# Patient Record
Sex: FEMALE | Race: WHITE
[De-identification: names, ages, dates, MRNs, and addresses within clinical notes are randomized per-mention and may not be internally consistent; named-entity substitution may affect disease eponyms.]

---

## 2017-07-01 ENCOUNTER — HEALTH MAINTENANCE LETTER (OUTPATIENT)
Age: 62
End: 2017-07-01

## 2018-06-12 ENCOUNTER — RADIANT APPOINTMENT (OUTPATIENT)
Dept: GENERAL RADIOLOGY | Facility: CLINIC | Age: 63
End: 2018-06-12
Attending: INTERNAL MEDICINE
Payer: OTHER GOVERNMENT

## 2018-06-12 ENCOUNTER — OFFICE VISIT (OUTPATIENT)
Dept: URGENT CARE | Facility: URGENT CARE | Age: 63
End: 2018-06-12
Payer: OTHER GOVERNMENT

## 2018-06-12 VITALS
TEMPERATURE: 98.6 F | SYSTOLIC BLOOD PRESSURE: 128 MMHG | WEIGHT: 189 LBS | DIASTOLIC BLOOD PRESSURE: 73 MMHG | BODY MASS INDEX: 29.6 KG/M2 | OXYGEN SATURATION: 96 % | RESPIRATION RATE: 17 BRPM | HEART RATE: 78 BPM

## 2018-06-12 DIAGNOSIS — J45.909 ASTHMA, UNSPECIFIED ASTHMA SEVERITY, UNSPECIFIED WHETHER COMPLICATED, UNSPECIFIED WHETHER PERSISTENT: ICD-10-CM

## 2018-06-12 DIAGNOSIS — B07.8 COMMON WART: ICD-10-CM

## 2018-06-12 DIAGNOSIS — R05.9 COUGH: Primary | ICD-10-CM

## 2018-06-12 DIAGNOSIS — R05.9 COUGH: ICD-10-CM

## 2018-06-12 PROCEDURE — 99204 OFFICE O/P NEW MOD 45 MIN: CPT | Performed by: INTERNAL MEDICINE

## 2018-06-12 PROCEDURE — 71046 X-RAY EXAM CHEST 2 VIEWS: CPT | Mod: FY

## 2018-06-12 RX ORDER — MONTELUKAST SODIUM 10 MG/1
TABLET ORAL
COMMUNITY

## 2018-06-12 RX ORDER — CYANOCOBALAMIN 1000 UG/ML
1 INJECTION, SOLUTION INTRAMUSCULAR; SUBCUTANEOUS
COMMUNITY

## 2018-06-12 RX ORDER — POTASSIUM CHLORIDE 750 MG/1
TABLET, EXTENDED RELEASE ORAL
Refills: 2 | COMMUNITY
Start: 2018-05-22

## 2018-06-12 RX ORDER — PANTOPRAZOLE SODIUM 40 MG/1
TABLET, DELAYED RELEASE ORAL
COMMUNITY
Start: 2018-04-06

## 2018-06-12 RX ORDER — FAMOTIDINE 20 MG/1
TABLET, FILM COATED ORAL
COMMUNITY
Start: 2018-04-08

## 2018-06-12 RX ORDER — CITALOPRAM HYDROBROMIDE 20 MG/1
TABLET ORAL
COMMUNITY

## 2018-06-12 RX ORDER — LISINOPRIL 10 MG/1
TABLET ORAL
COMMUNITY

## 2018-06-12 RX ORDER — AZITHROMYCIN 250 MG/1
TABLET, FILM COATED ORAL
COMMUNITY

## 2018-06-12 RX ORDER — MULTIVITAMIN,THERAPEUTIC
1 TABLET ORAL DAILY
COMMUNITY

## 2018-06-12 RX ORDER — LORATADINE 10 MG/1
TABLET ORAL
Refills: 3 | COMMUNITY
Start: 2018-05-27

## 2018-06-12 RX ORDER — FERROUS SULFATE 325(65) MG
TABLET ORAL
Refills: 2 | COMMUNITY
Start: 2018-05-27

## 2018-06-12 RX ORDER — FLUTICASONE PROPIONATE 50 MCG
SPRAY, SUSPENSION (ML) NASAL
COMMUNITY

## 2018-06-12 RX ORDER — GABAPENTIN 600 MG/1
600 TABLET ORAL
COMMUNITY

## 2018-06-12 NOTE — PROGRESS NOTES
SUBJECTIVE:  Erma Pat is an 62 year old female who presents for health concerns.  Is on remicade for chron's since 2017.  Had pneumonia 3 times in 2017.  Had a cough recently and was put on abx zmax, started on June 4th, and completed it 2 days ago.    Completed zmax and cough had improved some but not fully resolved.  Then cough started to be worse again.  And now coughing up some green sputum.  No fevers, chills, or sweats.   At night will take some tessalon perles if needed, which helps some.   No recent intl travel.  Lives in [facility].    Also has been treating a wart on right index finger with liquid medication and with duct tape.  Started to hurt a little and seem like it's more lifted up.         has a past medical history of Asthma, exercise induced; Depression; Osteoarthritis of knee; S/P carpal tunnel release (12/21/2001); S/P gastric bypass (2003); Seasonal allergic rhinitis; and Status post total abdominal hysterectomy (2000). gerd, asthma, hypothyroidism.  Social History     Social History     Marital status:      Spouse name: Torres     Number of children: 2     Years of education: N/A     Occupational History      Unknown     Social History Main Topics     Smoking status: Former Smoker     Types: Cigarettes     Quit date: 3/3/1977     Smokeless tobacco: Not on file      Comment: 35 years ago     Alcohol use Yes      Comment: once a year     Drug use: Not on file     Sexual activity: Not on file     Other Topics Concern     Not on file     Social History Narrative     No narrative on file     Family History   Problem Relation Age of Onset     DIABETES Father      DIABETES Maternal Grandmother      Hypertension Mother        ALLERGIES:  Review of patient's allergies indicates no known allergies.    Current Outpatient Prescriptions   Medication     ASPIRIN NOT PRESCRIBED (INTENTIONAL)     BLOOD GLUCOSE METER KIT     BLOOD GLUCOSE TEST STRIPS STRP     calcium-vitamin D (CALTRATE) 600-400 MG-UNIT  per tablet     citalopram (CELEXA) 20 MG tablet     cyanocobalamin (VITAMIN B12) 1000 MCG/ML injection     famotidine (PEPCID) 20 MG tablet     ferrous sulfate (IRON) 325 (65 Fe) MG tablet     fluticasone (FLONASE) 50 MCG/ACT spray     gabapentin (NEURONTIN) 600 MG tablet     INDAPAMIDE 1.25 MG OR TABS     lisinopril (PRINIVIL/ZESTRIL) 10 MG tablet     loratadine (CLARITIN) 10 MG tablet     montelukast (SINGULAIR) 10 MG tablet     multivitamin, therapeutic (THERA-VIT) TABS tablet     pantoprazole (PROTONIX) 40 MG EC tablet     potassium chloride SA (K-DUR/KLOR-CON M) 10 MEQ CR tablet     azithromycin (ZITHROMAX) 250 MG tablet     levothyroxine (SYNTHROID, LEVOTHROID) 50 MCG tablet     naproxen (NAPROSYN) 500 MG tablet     POTASSIUM CHLORIDE 10 MEQ OR CPCR     PREMARIN 0.625 MG OR TABS     PROAIR  (90 BASE) MCG/ACT IN AERS     quetiapine (SEROQUEL) 50 MG tablet   advair 2 puffs daily.      ROS:  ROS is done and is negative for general/constitutional, eye, ENT, Respiratory, cardiovascular, GI, , Skin, musculoskeletal except as noted elsewhere.  All other review of systems negative except as noted elsewhere.      OBJECTIVE:  /73 (BP Location: Left arm, Patient Position: Chair, Cuff Size: Adult Large)  Pulse 78  Temp 98.6  F (37  C) (Oral)  Resp 17  Wt 189 lb (85.7 kg)  SpO2 96%  BMI 29.6 kg/m2  GENERAL APPEARANCE: Alert, in no acute distress  EYES: normal  EARS: External ears normal. Canals clear. TM's normal.  NOSE:mild clear discharge  OROPHARYNX:normal  NECK:No adenopathy,masses or thyromegaly  RESP: normal and clear to auscultation  CV:regular rate and rhythm and no murmurs, clicks, or gallops  ABDOMEN: Abdomen soft, non-tender. BS normal. No masses, organomegaly  SKIN: right index finger with raised coarse lesion slightly darker than skin color with capillary ends visible, c/w wart.  No surrounding erythema or edema.  MUSCULOSKELETAL:Musculoskeletal normal.  No ankle  edema      RESULTS  .  Recent Results (from the past 48 hour(s))   XR Chest 2 Views    Narrative    CHEST TWO VIEWS   6/12/2018 5:04 PM     HISTORY: Cough.    COMPARISON: None.     FINDINGS: Cardiomediastinal silhouette within normal limits. No focal  airspace opacities. No pleural effusion. No pneumothorax identified.  The bones are unremarkable.       Impression    IMPRESSION: No acute cardiopulmonary abnormalities.       ASSESSMENT/PLAN:    ASSESSMENT / PLAN:  (R05) Cough  (primary encounter diagnosis)  Comment: currently no sign of pneumonia.  Suspect cough is a combination of post-infection cough and asthma.  Also suspect nasal drainage contributing to sxs, so is also advised to use flonase nasal spray, which she has but has not been using  Plan: XR Chest 2 Views        Pt to continue advair daily.  Advised to use her albuterol inhaler prn, which she has not been doing with her cough.  I reviewed supportive care, expected course, and signs of concern.  Follow up as needed or if she does not improve within 7 day(s) or if worsens in any way.  Reviewed red flag symptoms and is to go to the ER if experiences any of these.    (B07.8) Common wart  Comment: index finger.  Discussed tx options and she did not want it frozen today but wants to use otc compound W  Plan: I reviewed supportive care, expected course, and signs of concern.  Follow up as needed or if she does not improve within 3 week(s) or if worsens in any way.  Reviewed red flag symptoms and is to go to the ER if experiences any of these.      (J45.909) Asthma, unspecified asthma severity, unspecified whether complicated, unspecified whether persistent  Comment: contributing to cough as noted above.  No wheezing  Plan: continue routine meds, plus albuterol as noted above.      See [facility] for orders, medications, letters, patient instructions    Marly Genao M.D.

## 2018-06-12 NOTE — MR AVS SNAPSHOT
"              After Visit Summary   6/12/2018    Erma Pat    MRN: [id number]           Patient Information     Date Of Birth          1955        Visit Information        Provider Department      6/12/2018 4:10 PM Marly Genao MD [facility]        Today's Diagnoses     Cough    -  1    Common wart        Asthma, unspecified asthma severity, unspecified whether complicated, unspecified whether persistent           Follow-ups after your visit        Follow-up notes from your care team     Return in about 1 week (around 6/19/2018), or if symptoms worsen or fail to improve.      Who to contact     If you have questions or need follow up information about today's clinic visit or your schedule please contact [facility] directly at [phone].  Normal or non-critical lab and imaging results will be communicated to you by MyChart, letter or phone within 4 business days after the clinic has received the results. If you do not hear from us within 7 days, please contact the clinic through MyChart or phone. If you have a critical or abnormal lab result, we will notify you by phone as soon as possible.  Submit refill requests through [employer] or call your pharmacy and they will forward the refill request to us. Please allow 3 business days for your refill to be completed.          Additional Information About Your Visit        MyChart Information     [employer] lets you send messages to your doctor, view your test results, renew your prescriptions, schedule appointments and more. To sign up, go to [email or web address] . Click on \"Log in\" on the left side of the screen, which will take you to the Welcome page. Then click on \"Sign up Now\" on the right side of the page.     You will be asked to enter the access code listed below, as well as some personal information. Please follow the directions to create your username and password.     Your access code is: " [email or web address]  Expires: 9/10/2018  6:12 PM     Your access code will  in 90 days. If you need help or a new code, please call your [facility] or [phone].        Care EveryWhere ID     This is your Care EveryWhere ID. This could be used by other organizations to access your [place] medical records  [id number]        Your Vitals Were     Pulse Temperature Respirations Pulse Oximetry BMI (Body Mass Index)       78 98.6  F (37  C) (Oral) 17 96% 29.6 kg/m2        Blood Pressure from Last 3 Encounters:   18 128/73   13 132/85   12/04/10 148/89    Weight from Last 3 Encounters:   18 189 lb (85.7 kg)   13 205 lb (93 kg)   12/04/10 212 lb (96.2 kg)               Primary Care Provider Office Phone # Fax #    Jordi Lunsford [phone]       [id number]  [facility] [id number]        Equal Access to Services     [facility]: Hadii aad ku hadasho Soomaali, waaxda luqadaha, qaybta kaalmada adeegyada, waxay idiin hayrahul leavitt . So [facility] [phone].    ATENCIÓN: Si habla español, tiene a wynne disposición servicios gratuitos de asistencia lingüística. Llame al [phone].    We comply with applicable federal civil rights laws and [place] laws. We do not discriminate on the basis of race, color, national origin, age, disability, sex, sexual orientation, or gender identity.            Thank you!     Thank you for choosing [facility]  for your care. Our goal is always to provide you with excellent care. Hearing back from our patients is one way we can continue to improve our services. Please take a few minutes to complete the written survey that you may receive in the mail after your visit with us. Thank you!             Your Updated Medication List - Protect others around you: Learn how to safely use, store and throw away your medicines at www.disposemymeds.org.          This list is accurate as of 18  6:12 PM.  Always use your  most recent med list.                   Brand Name Dispense Instructions for use Diagnosis    ASPIRIN NOT PRESCRIBED    INTENTIONAL     due to rectal bleeding        azithromycin 250 MG tablet    ZITHROMAX     azithromycin 250 mg tablet        * blood glucose monitoring meter device kit    no brand specified    1 Kit    PER PATIENT HEALTH PLAN    Family history of diabetes mellitus (DM)       * BLOOD GLUCOSE TEST STRIPS STRP     50 Strip    PER PATIENT HEALTH PLAN test daily    Family history of diabetes mellitus (DM)       calcium-vitamin D 600-400 MG-UNIT per tablet    CALTRATE     Take 1 tablet by mouth 2 times daily        citalopram 20 MG tablet    celeXA     citalopram 20 mg tablet        cyanocobalamin 1000 MCG/ML injection    VITAMIN B12     Inject 1 mL into the muscle every 30 days        famotidine 20 MG tablet    PEPCID          ferrous sulfate 325 (65 Fe) MG tablet    IRON     TK 1 T PO TID        fluticasone 50 MCG/ACT spray    FLONASE     fluticasone 50 mcg/actuation nasal spray,suspension        gabapentin 600 MG tablet    NEURONTIN     600 mg        indapamide 1.25 MG tablet    LOZOL    90 Tab    1 TABLETS EVERY MORNING    HTN (hypertension)       levothyroxine 50 MCG tablet    SYNTHROID/LEVOTHROID    90 tablet    Take 1 tablet by mouth daily.    Hypothyroidism       lisinopril 10 MG tablet    PRINIVIL/ZESTRIL     lisinopril 10 mg tablet        loratadine 10 MG tablet    CLARITIN     TK 1 T PO QD        montelukast 10 MG tablet    SINGULAIR     montelukast 10 mg tablet        multivitamin, therapeutic Tabs tablet      Take 1 tablet by mouth daily        naproxen 500 MG tablet    NAPROSYN    60 tablet    TAKE 1 TABLET TWICE A DAY AS NEEDED FOR MODERATE PAIN (PLEASE FOLLOW UP IN CLINIC FOR NEXT REFILL)    Osteoarthrosis, unspecified whether generalized or localized, lower leg       pantoprazole 40 MG EC tablet    PROTONIX          potassium chloride 10 MEQ CR capsule    MICRO-K    90 Cap    ONE TABLET  DAILY    HTN (hypertension)       potassium chloride SA 10 MEQ CR tablet    K-DUR/KLOR-CON M          PREMARIN 0.625 MG tablet   Generic drug:  estrogens (conjugated)     90 Tab    ONE DAILY    Menopause       PROAIR  (90 Base) MCG/ACT Inhaler   Generic drug:  albuterol      2 puffs every 6 hrs prn        QUEtiapine 50 MG tablet    SEROQUEL    60 tablet    Take 2 tablets by mouth At Bedtime. Needs appointment for next refill.    Insomnia       * Notice:  This list has 2 medication(s) that are the same as other medications prescribed for you. Read the directions carefully, and ask your doctor or other care provider to review them with you.

## 2020-04-23 ENCOUNTER — HOSPITAL ENCOUNTER (OUTPATIENT)
Dept: HOSPITAL 90 - RAH | Age: 65
Discharge: HOME | End: 2020-04-23
Attending: FAMILY MEDICINE
Payer: OTHER GOVERNMENT

## 2020-04-23 DIAGNOSIS — N64.89: ICD-10-CM

## 2020-04-23 DIAGNOSIS — Z12.31: Primary | ICD-10-CM

## 2020-04-23 PROCEDURE — 77067 SCR MAMMO BI INCL CAD: CPT

## 2024-12-10 ENCOUNTER — HOSPITAL ENCOUNTER (OUTPATIENT)
Dept: HOSPITAL 90 - RAH | Age: 69
Discharge: HOME | End: 2024-12-10
Attending: INTERNAL MEDICINE
Payer: MEDICARE

## 2024-12-10 DIAGNOSIS — I31.39: ICD-10-CM

## 2024-12-10 DIAGNOSIS — I27.20: ICD-10-CM

## 2024-12-10 DIAGNOSIS — I34.0: Primary | ICD-10-CM

## 2024-12-10 DIAGNOSIS — I51.81: ICD-10-CM

## 2024-12-10 PROCEDURE — 93306 TTE W/DOPPLER COMPLETE: CPT

## 2024-12-10 NOTE — HMCSR
--------------- APPROVED REPORT --------------





EXAM: Two-dimensional and M-mode echocardiogram with Doppler and color Doppler.



INDICATION

ICD:  Takotsubo cardiomyopathy I51.81  



2D Dimensions

RVDd2.9 cmLVEF(%)60.4 (>50%)LVED Vol(simp.)44.7 mL

IVSd1.4 (0.7-1.1cm)FS(%)30 %LVES Vol(simp.)18.3 mL

LVDd2.3 (3.8-5.6cm)LA (2D)1.9 (1.6-4.0cm)LVEF(%, simp.)59 %

PWd1.2 (0.7-1.1cm)Ao Root(2D)3.4 (2.0-3.7cm)LA ESV INDEX (4CH)14.30 mL/m2

IVSs1.6 cmLVOT diam1.9 (1.8-2.4cm)LA ESV INDEX (2CH)14.90 mL/m2

LVDs1.6 (2.5-4.0cm)

PWs1.4 cm



M-Mode Dimensions

EPSS0.6 cm

LA (MM)1.7 (1.6-4.0cm)

Ao Root(MM)3.4 (2.0-3.7cm)



Aortic Valve

AoV VTI0.8 mAo Mean GR43.0 mmHgLVOT VTI0.40 m

ADELE (VMAX)1.5 cm2AVA (VTI) 1.5 cm2



Mitral Valve

MV E Vmax65.0 cm/sDECEL Ngmz860 ms

MV A Vmax83.9 cm/sP 1/2 T30 ms

E/A ratio0.8MVA (PHT)7.3 cm2

MR Max  mmHg



TDI

E/E' Xbisor90.1E/E' Aowtfms27.7

Medial E' Peak V3.40 cm/sLateral E' Peak V3.30 cm/s



Pulmonary Valve

PV Vmax0.9 m/s

PV Peak GR3.1 mmHg



Tricuspid Valve

TR Vmax2.4 m/sRAP (EST) 3 kfSqVIAJ98.6 mmHg

TR Peak GR22.6 mmHg



Left Ventricle

Left ventricular cavity size is normal. Normal wall motion Moderate concentric left ventricular hyper
trophy. LVEF is >55%. Stage I diastolic dysfunction.



Right Ventricle

The right ventricle is normal size. The right ventricle is hyperdynamic.



Atria

The left atrium size is normal. The right atrium size is normal.



Aortic Valve

Aortic valve leaflets open well. No aortic regurgitation is present. There is no aortic valvular sten
osis.  LVOT obstruction with pk gradient of 109mmHg and mean gradient of 43mmHg. 



Mitral Valve

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 Systolic anterior motion o
f the mitral valve leaflet present. There is mild mitral valve regurgitation noted. There is no edgar
l valve stenosis.



Tricuspid Valve

The tricuspid valve is normal in structure and function. There is trace of tricuspid valve regurgitat
ion noted.



Pulmonic Valve

The pulmonary valve is normal in structure and function. There is no pulmonic valvular regurgitation.
 



Great Vessels

The aortic root is normal in size. The IVC is normal in size and collapses >50% with inspiration.



Pericardium

Small pericardial effusion.



 Other Information 

Quality : Adequate



Conclusion

LVEF is >55%.

Moderate concentric left ventricular hypertrophy.

Left ventricular cavity size is normal.

Stage I diastolic dysfunction.

Normal wall motion

There is no aortic valvular stenosis. 

LVOT obstruction with pk gradient of 109mmHg and mean gradient of 43mmHg.

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 Systolic anterior motion o
f the mitral valve leaflet present.

There is mild mitral valve regurgitation noted.

Small pericardial effusion.

Mild pulmonary hypertension

Study quality was adequate

## 2025-01-17 ENCOUNTER — HOSPITAL ENCOUNTER (OUTPATIENT)
Dept: HOSPITAL 90 - RAH | Age: 70
Discharge: HOME | End: 2025-01-17
Attending: INTERNAL MEDICINE
Payer: MEDICARE

## 2025-01-17 DIAGNOSIS — K44.9: ICD-10-CM

## 2025-01-17 DIAGNOSIS — K22.2: Primary | ICD-10-CM

## 2025-01-17 DIAGNOSIS — R93.3: ICD-10-CM

## 2025-01-17 PROCEDURE — 74220 X-RAY XM ESOPHAGUS 1CNTRST: CPT

## 2025-01-17 NOTE — HMCIMG
ESOPHAGUS



REASON: Esophageal obstruction; Abnormal findings on diagnostic imaging

of other pa



COMPARISON: None 



TECHNIQUE: Esophagram was performed with fluoroscopic observation,

fluoroscopy time 1 minute.  Exam was performed with difficulty as

patient was weak and had to difficult time standing.  Initial barium

administration was with the table and approximately 60 degree angle. 



FINDINGS: 



Swallowing mechanism appears unremarkable.  Proximal and mid esophagus

appear normal.  There is a long segment of irregular narrowing in the

distal esophagus.  This measures approximately 5 cm.  The narrowing

appears fixed and does not distend with peristalsis.  The narrowest

point in the distal esophagus is between 3 and 4 mm. 



 Patient was turned to the supine position for additional images. 

Patient was then raised to 260 degrees.  There was retained barium in

the esophagus, this again showed persistent marked narrowing of the

distal esophagus.



There is a small sliding hiatal hernia.  Exam is otherwise unremarkable.



IMPRESSION: 

  

1.  Severe irregular narrowing of the last 5 cm of the esophagus, the

narrowest point is approximately 3 to 4 mm.

2.  Otherwise unremarkable exam.

## 2025-01-28 ENCOUNTER — HOSPITAL ENCOUNTER (OUTPATIENT)
Dept: HOSPITAL 90 - ENDO | Age: 70
Discharge: HOME | End: 2025-01-28
Attending: INTERNAL MEDICINE
Payer: MEDICARE

## 2025-01-28 VITALS
TEMPERATURE: 97.4 F | SYSTOLIC BLOOD PRESSURE: 109 MMHG | HEART RATE: 90 BPM | RESPIRATION RATE: 16 BRPM | DIASTOLIC BLOOD PRESSURE: 77 MMHG

## 2025-01-28 VITALS
TEMPERATURE: 97 F | HEART RATE: 78 BPM | SYSTOLIC BLOOD PRESSURE: 112 MMHG | DIASTOLIC BLOOD PRESSURE: 78 MMHG | RESPIRATION RATE: 14 BRPM

## 2025-01-28 VITALS
SYSTOLIC BLOOD PRESSURE: 119 MMHG | HEART RATE: 84 BPM | RESPIRATION RATE: 16 BRPM | DIASTOLIC BLOOD PRESSURE: 76 MMHG | TEMPERATURE: 97 F

## 2025-01-28 VITALS
SYSTOLIC BLOOD PRESSURE: 120 MMHG | TEMPERATURE: 97 F | DIASTOLIC BLOOD PRESSURE: 76 MMHG | RESPIRATION RATE: 10 BRPM | HEART RATE: 61 BPM

## 2025-01-28 VITALS
TEMPERATURE: 97 F | DIASTOLIC BLOOD PRESSURE: 77 MMHG | SYSTOLIC BLOOD PRESSURE: 116 MMHG | HEART RATE: 78 BPM | RESPIRATION RATE: 16 BRPM

## 2025-01-28 VITALS — SYSTOLIC BLOOD PRESSURE: 120 MMHG | RESPIRATION RATE: 15 BRPM | DIASTOLIC BLOOD PRESSURE: 71 MMHG | HEART RATE: 77 BPM

## 2025-01-28 VITALS
DIASTOLIC BLOOD PRESSURE: 72 MMHG | TEMPERATURE: 97 F | SYSTOLIC BLOOD PRESSURE: 116 MMHG | HEART RATE: 80 BPM | RESPIRATION RATE: 16 BRPM

## 2025-01-28 VITALS
DIASTOLIC BLOOD PRESSURE: 74 MMHG | RESPIRATION RATE: 16 BRPM | TEMPERATURE: 97 F | HEART RATE: 81 BPM | SYSTOLIC BLOOD PRESSURE: 120 MMHG

## 2025-01-28 VITALS
TEMPERATURE: 97.2 F | HEART RATE: 72 BPM | DIASTOLIC BLOOD PRESSURE: 70 MMHG | RESPIRATION RATE: 14 BRPM | SYSTOLIC BLOOD PRESSURE: 118 MMHG

## 2025-01-28 VITALS
HEART RATE: 86 BPM | DIASTOLIC BLOOD PRESSURE: 71 MMHG | SYSTOLIC BLOOD PRESSURE: 120 MMHG | RESPIRATION RATE: 16 BRPM | TEMPERATURE: 97 F

## 2025-01-28 VITALS — HEIGHT: 61 IN | BODY MASS INDEX: 19.45 KG/M2 | WEIGHT: 103 LBS

## 2025-01-28 DIAGNOSIS — Z98.84: ICD-10-CM

## 2025-01-28 DIAGNOSIS — Z79.899: ICD-10-CM

## 2025-01-28 DIAGNOSIS — R13.10: Primary | ICD-10-CM

## 2025-01-28 DIAGNOSIS — K59.09: ICD-10-CM

## 2025-01-28 DIAGNOSIS — E78.5: ICD-10-CM

## 2025-01-28 DIAGNOSIS — Z87.898: ICD-10-CM

## 2025-01-28 DIAGNOSIS — K21.00: ICD-10-CM

## 2025-01-28 DIAGNOSIS — I51.81: ICD-10-CM

## 2025-01-28 DIAGNOSIS — F32.A: ICD-10-CM

## 2025-01-28 DIAGNOSIS — K50.90: ICD-10-CM

## 2025-01-28 DIAGNOSIS — I10: ICD-10-CM

## 2025-01-28 DIAGNOSIS — K22.2: ICD-10-CM

## 2025-01-28 DIAGNOSIS — K57.30: ICD-10-CM

## 2025-01-28 DIAGNOSIS — Z98.890: ICD-10-CM

## 2025-01-28 DIAGNOSIS — R93.3: ICD-10-CM

## 2025-01-28 DIAGNOSIS — E03.9: ICD-10-CM

## 2025-01-28 PROCEDURE — A4221 SUPP NON-INSULIN INF CATH/WK: HCPCS

## 2025-01-28 PROCEDURE — A4223 INFUSION SUPPLIES W/O PUMP: HCPCS

## 2025-01-28 PROCEDURE — A4615 CANNULA NASAL: HCPCS

## 2025-01-28 PROCEDURE — A4663 DIALYSIS BLOOD PRESSURE CUFF: HCPCS

## 2025-01-28 PROCEDURE — A4215 STERILE NEEDLE: HCPCS

## 2025-01-28 PROCEDURE — A4222 INFUSION SUPPLIES WITH PUMP: HCPCS

## 2025-01-28 PROCEDURE — 43239 EGD BIOPSY SINGLE/MULTIPLE: CPT

## 2025-01-28 PROCEDURE — C1726 CATH, BAL DIL, NON-VASCULAR: HCPCS

## 2025-01-28 PROCEDURE — A4606 OXYGEN PROBE USED W OXIMETER: HCPCS

## 2025-01-28 PROCEDURE — 43249 ESOPH EGD DILATION <30 MM: CPT

## 2025-01-28 RX ADMIN — SODIUM CHLORIDE ONE MLS/HR: 0.9 INJECTION, SOLUTION INTRAVENOUS at 06:26

## 2025-01-28 NOTE — NUR
Full and complete discharge instructions given to Patient and family. Voiced understanding 
of GI procedures and follow up expectations/Diet. All questions answered. PIV removed with 
catheter tip intact.

W/C to POV with Family.

## 2025-04-04 ENCOUNTER — HOSPITAL ENCOUNTER (INPATIENT)
Dept: HOSPITAL 90 - EDH | Age: 70
LOS: 10 days | Discharge: NURSING, REHAB OR LONG-TERM CARE | DRG: 393 | End: 2025-04-14
Attending: INTERNAL MEDICINE | Admitting: INTERNAL MEDICINE
Payer: MEDICARE

## 2025-04-04 VITALS — HEART RATE: 85 BPM | RESPIRATION RATE: 19 BRPM | OXYGEN SATURATION: 100 %

## 2025-04-04 VITALS — HEIGHT: 67 IN | WEIGHT: 139 LBS | BODY MASS INDEX: 21.82 KG/M2

## 2025-04-04 DIAGNOSIS — I11.0: ICD-10-CM

## 2025-04-04 DIAGNOSIS — Z87.11: ICD-10-CM

## 2025-04-04 DIAGNOSIS — E78.5: ICD-10-CM

## 2025-04-04 DIAGNOSIS — Z85.01: ICD-10-CM

## 2025-04-04 DIAGNOSIS — F41.9: ICD-10-CM

## 2025-04-04 DIAGNOSIS — Z16.11: ICD-10-CM

## 2025-04-04 DIAGNOSIS — K57.30: ICD-10-CM

## 2025-04-04 DIAGNOSIS — E03.9: ICD-10-CM

## 2025-04-04 DIAGNOSIS — N30.00: ICD-10-CM

## 2025-04-04 DIAGNOSIS — E43: ICD-10-CM

## 2025-04-04 DIAGNOSIS — Z74.01: ICD-10-CM

## 2025-04-04 DIAGNOSIS — Z20.822: ICD-10-CM

## 2025-04-04 DIAGNOSIS — Z79.899: ICD-10-CM

## 2025-04-04 DIAGNOSIS — I50.33: ICD-10-CM

## 2025-04-04 DIAGNOSIS — J44.9: ICD-10-CM

## 2025-04-04 DIAGNOSIS — T18.108A: ICD-10-CM

## 2025-04-04 DIAGNOSIS — K22.2: ICD-10-CM

## 2025-04-04 DIAGNOSIS — R18.8: ICD-10-CM

## 2025-04-04 DIAGNOSIS — R62.7: ICD-10-CM

## 2025-04-04 DIAGNOSIS — K21.9: ICD-10-CM

## 2025-04-04 DIAGNOSIS — J69.0: ICD-10-CM

## 2025-04-04 DIAGNOSIS — I31.39: ICD-10-CM

## 2025-04-04 DIAGNOSIS — I42.9: ICD-10-CM

## 2025-04-04 DIAGNOSIS — T18.3XXA: Primary | ICD-10-CM

## 2025-04-04 DIAGNOSIS — Z93.1: ICD-10-CM

## 2025-04-04 DIAGNOSIS — E11.9: ICD-10-CM

## 2025-04-04 DIAGNOSIS — Z98.0: ICD-10-CM

## 2025-04-04 DIAGNOSIS — D53.9: ICD-10-CM

## 2025-04-04 DIAGNOSIS — D72.819: ICD-10-CM

## 2025-04-04 DIAGNOSIS — D51.9: ICD-10-CM

## 2025-04-04 DIAGNOSIS — Z98.84: ICD-10-CM

## 2025-04-04 DIAGNOSIS — K59.00: ICD-10-CM

## 2025-04-04 DIAGNOSIS — F32.A: ICD-10-CM

## 2025-04-04 DIAGNOSIS — F43.20: ICD-10-CM

## 2025-04-04 DIAGNOSIS — K50.90: ICD-10-CM

## 2025-04-04 DIAGNOSIS — K22.3: ICD-10-CM

## 2025-04-04 LAB
BASOPHILS # BLD AUTO: 0.02 K/UL (ref 0–0.2)
BASOPHILS NFR BLD AUTO: 0.5 % (ref 0–5)
CREAT SERPL-MCNC: 0.4 MG/DL (ref 0.5–1)
ERYTHROCYTE [DISTWIDTH] IN BLOOD BY AUTOMATED COUNT: 20.9 % (ref 11–15.5)
HCT VFR BLD AUTO: 32.3 % (ref 36–48)
IMM GRANULOCYTES # BLD: 0.01 K/UL (ref 0–1)
LYMPHOCYTES # SPEC AUTO: 0.6 K/UL (ref 1–4.8)
LYMPHOCYTES NFR SPEC AUTO: 13.3 % (ref 21–51)
MCH RBC QN AUTO: 33.3 PG (ref 27–33)
MCV RBC AUTO: 107.7 FL (ref 79–99)
MONOCYTES # BLD AUTO: 0.2 K/UL (ref 0.1–1)
NEUTROPHILS # BLD AUTO: 3.6 K/UL (ref 1.8–7.7)
PLATELET # BLD AUTO: 260 K/UL (ref 130–400)
POTASSIUM SERPL-SCNC: 3.8 MMOL/L (ref 3.5–5.1)
RBC MORPH BLD: (no result)
SARS-COV-2 AG RESP QL IA.RAPID: (no result)
WBC # BLD AUTO: 4.4 K/UL (ref 4.8–10.8)

## 2025-04-04 PROCEDURE — 87426 SARSCOV CORONAVIRUS AG IA: CPT

## 2025-04-04 PROCEDURE — 81001 URINALYSIS AUTO W/SCOPE: CPT

## 2025-04-04 PROCEDURE — 74240 X-RAY XM UPR GI TRC 1CNTRST: CPT

## 2025-04-04 PROCEDURE — A4649 SURGICAL SUPPLIES: HCPCS

## 2025-04-04 PROCEDURE — 43266 EGD ENDOSCOPIC STENT PLACE: CPT

## 2025-04-04 PROCEDURE — A4222 INFUSION SUPPLIES WITH PUMP: HCPCS

## 2025-04-04 PROCEDURE — 83690 ASSAY OF LIPASE: CPT

## 2025-04-04 PROCEDURE — 74360 X-RAY GUIDE GI DILATION: CPT

## 2025-04-04 PROCEDURE — 36569 INSJ PICC 5 YR+ W/O IMAGING: CPT

## 2025-04-04 PROCEDURE — 85025 COMPLETE CBC W/AUTO DIFF WBC: CPT

## 2025-04-04 PROCEDURE — 93356 MYOCRD STRAIN IMG SPCKL TRCK: CPT

## 2025-04-04 PROCEDURE — 74018 RADEX ABDOMEN 1 VIEW: CPT

## 2025-04-04 PROCEDURE — 93005 ELECTROCARDIOGRAM TRACING: CPT

## 2025-04-04 PROCEDURE — 84484 ASSAY OF TROPONIN QUANT: CPT

## 2025-04-04 PROCEDURE — A4346 CATH INDW FOLEY 3 WAY: HCPCS

## 2025-04-04 PROCEDURE — 84100 ASSAY OF PHOSPHORUS: CPT

## 2025-04-04 PROCEDURE — 80048 BASIC METABOLIC PNL TOTAL CA: CPT

## 2025-04-04 PROCEDURE — 43235 EGD DIAGNOSTIC BRUSH WASH: CPT

## 2025-04-04 PROCEDURE — 84439 ASSAY OF FREE THYROXINE: CPT

## 2025-04-04 PROCEDURE — A4215 STERILE NEEDLE: HCPCS

## 2025-04-04 PROCEDURE — C1874 STENT, COATED/COV W/DEL SYS: HCPCS

## 2025-04-04 PROCEDURE — 94640 AIRWAY INHALATION TREATMENT: CPT

## 2025-04-04 PROCEDURE — 99285 EMERGENCY DEPT VISIT HI MDM: CPT

## 2025-04-04 PROCEDURE — 94664 DEMO&/EVAL PT USE INHALER: CPT

## 2025-04-04 PROCEDURE — 43200 ESOPHAGOSCOPY FLEXIBLE BRUSH: CPT

## 2025-04-04 PROCEDURE — 84425 ASSAY OF VITAMIN B-1: CPT

## 2025-04-04 PROCEDURE — 80053 COMPREHEN METABOLIC PANEL: CPT

## 2025-04-04 PROCEDURE — A4223 INFUSION SUPPLIES W/O PUMP: HCPCS

## 2025-04-04 PROCEDURE — 43247 EGD REMOVE FOREIGN BODY: CPT

## 2025-04-04 PROCEDURE — A4344 CATH INDW FOLEY 2 WAY SILICN: HCPCS

## 2025-04-04 PROCEDURE — 92610 EVALUATE SWALLOWING FUNCTION: CPT

## 2025-04-04 PROCEDURE — A4620 VARIABLE CONCENTRATION MASK: HCPCS

## 2025-04-04 PROCEDURE — 87086 URINE CULTURE/COLONY COUNT: CPT

## 2025-04-04 PROCEDURE — 87804 INFLUENZA ASSAY W/OPTIC: CPT

## 2025-04-04 PROCEDURE — 82948 REAGENT STRIP/BLOOD GLUCOSE: CPT

## 2025-04-04 PROCEDURE — 84132 ASSAY OF SERUM POTASSIUM: CPT

## 2025-04-04 PROCEDURE — 85730 THROMBOPLASTIN TIME PARTIAL: CPT

## 2025-04-04 PROCEDURE — A7002 TUBING USED W SUCTION PUMP: HCPCS

## 2025-04-04 PROCEDURE — 83880 ASSAY OF NATRIURETIC PEPTIDE: CPT

## 2025-04-04 PROCEDURE — 87040 BLOOD CULTURE FOR BACTERIA: CPT

## 2025-04-04 PROCEDURE — 84481 FREE ASSAY (FT-3): CPT

## 2025-04-04 PROCEDURE — 36415 COLL VENOUS BLD VENIPUNCTURE: CPT

## 2025-04-04 PROCEDURE — 84145 PROCALCITONIN (PCT): CPT

## 2025-04-04 PROCEDURE — 93306 TTE W/DOPPLER COMPLETE: CPT

## 2025-04-04 PROCEDURE — 87186 SC STD MICRODIL/AGAR DIL: CPT

## 2025-04-04 PROCEDURE — A4606 OXYGEN PROBE USED W OXIMETER: HCPCS

## 2025-04-04 PROCEDURE — 71045 X-RAY EXAM CHEST 1 VIEW: CPT

## 2025-04-04 PROCEDURE — 80076 HEPATIC FUNCTION PANEL: CPT

## 2025-04-04 PROCEDURE — 83735 ASSAY OF MAGNESIUM: CPT

## 2025-04-04 PROCEDURE — 82306 VITAMIN D 25 HYDROXY: CPT

## 2025-04-04 PROCEDURE — 85027 COMPLETE CBC AUTOMATED: CPT

## 2025-04-04 PROCEDURE — 71250 CT THORAX DX C-: CPT

## 2025-04-04 PROCEDURE — 85610 PROTHROMBIN TIME: CPT

## 2025-04-04 PROCEDURE — 82607 VITAMIN B-12: CPT

## 2025-04-04 PROCEDURE — 74150 CT ABDOMEN W/O CONTRAST: CPT

## 2025-04-04 PROCEDURE — 84443 ASSAY THYROID STIM HORMONE: CPT

## 2025-04-04 PROCEDURE — A4657 SYRINGE W/WO NEEDLE: HCPCS

## 2025-04-04 RX ADMIN — SODIUM CHLORIDE SCH MLS/HR: 0.9 INJECTION, SOLUTION INTRAVENOUS at 19:34

## 2025-04-04 RX ADMIN — SODIUM CHLORIDE SOLN NEBU 3% ONE ML: 3 NEBU SOLN at 23:26

## 2025-04-04 RX ADMIN — Medication SCH MLS/HR: at 19:30

## 2025-04-04 RX ADMIN — PIPERACILLIN SODIUM AND TAZOBACTAM SODIUM SCH GM: .375; 3 INJECTION, POWDER, LYOPHILIZED, FOR SOLUTION INTRAVENOUS at 21:48

## 2025-04-05 VITALS — RESPIRATION RATE: 18 BRPM | OXYGEN SATURATION: 99 %

## 2025-04-05 VITALS
SYSTOLIC BLOOD PRESSURE: 113 MMHG | RESPIRATION RATE: 16 BRPM | HEART RATE: 94 BPM | TEMPERATURE: 98 F | DIASTOLIC BLOOD PRESSURE: 64 MMHG

## 2025-04-05 VITALS
SYSTOLIC BLOOD PRESSURE: 131 MMHG | RESPIRATION RATE: 16 BRPM | TEMPERATURE: 97.9 F | DIASTOLIC BLOOD PRESSURE: 73 MMHG | HEART RATE: 87 BPM

## 2025-04-05 VITALS — HEART RATE: 86 BPM | RESPIRATION RATE: 18 BRPM

## 2025-04-05 VITALS — RESPIRATION RATE: 18 BRPM | HEART RATE: 88 BPM

## 2025-04-05 VITALS — RESPIRATION RATE: 18 BRPM | HEART RATE: 84 BPM | OXYGEN SATURATION: 94 %

## 2025-04-05 VITALS
RESPIRATION RATE: 16 BRPM | SYSTOLIC BLOOD PRESSURE: 111 MMHG | TEMPERATURE: 98.4 F | DIASTOLIC BLOOD PRESSURE: 66 MMHG | HEART RATE: 91 BPM

## 2025-04-05 VITALS — RESPIRATION RATE: 18 BRPM | HEART RATE: 96 BPM

## 2025-04-05 VITALS — HEART RATE: 84 BPM | RESPIRATION RATE: 18 BRPM

## 2025-04-05 VITALS — OXYGEN SATURATION: 95 %

## 2025-04-05 LAB
ALBUMIN SERPL-MCNC: 1.4 G/DL (ref 3.5–5)
APPEARANCE UR: CLEAR
BACTERIA URNS QL MICRO: (no result) /HPF
BASOPHILS # BLD AUTO: 0.02 K/UL (ref 0–0.2)
BASOPHILS NFR BLD AUTO: 0.5 % (ref 0–5)
BILIRUB DIRECT SERPL-MCNC: 0.1 MG/DL (ref 0–0.3)
BILIRUB SERPL-MCNC: 0.2 MG/DL (ref 0.2–1)
BILIRUB UR QL STRIP: NEGATIVE MG/DL
CELLS COUNTED: 100
COLOR UR: YELLOW
CREAT SERPL-MCNC: 0.4 MG/DL (ref 0.5–1)
DEPRECATED SQUAMOUS URNS QL MICRO: (no result) /HPF (ref 0–2)
ERYTHROCYTE [DISTWIDTH] IN BLOOD BY AUTOMATED COUNT: 20.7 % (ref 11–15.5)
GLUCOSE UR STRIP-MCNC: NEGATIVE MG/DL
HCT VFR BLD AUTO: 32.5 % (ref 36–48)
IMM GRANULOCYTES # BLD: 0.01 K/UL (ref 0–1)
KETONES UR STRIP-MCNC: 10 MG/DL
LEUKOCYTE ESTERASE UR QL STRIP: 75 LEU/UL
LYMPHOCYTES # SPEC AUTO: 0.5 K/UL (ref 1–4.8)
LYMPHOCYTES NFR BLD MANUAL: 8 % (ref 22–44)
LYMPHOCYTES NFR SPEC AUTO: 11.6 % (ref 21–51)
MANUAL DIF COMMENT BLD-IMP: (no result)
MCH RBC QN AUTO: 33.4 PG (ref 27–33)
MCHC RBC AUTO-ENTMCNC: 31.1 G/DL (ref 32–36)
MCV RBC AUTO: 107.6 FL (ref 79–99)
MICRO URNS: YES
MONOCYTES # BLD AUTO: 0.2 K/UL (ref 0.1–1)
MONOCYTES NFR BLD AUTO: 4.1 % (ref 3–13)
MUCOUS THREADS URNS QL MICRO: (no result) LPF
NEUTROPHILS # BLD AUTO: 3.5 K/UL (ref 1.8–7.7)
NEUTROPHILS NFR BLD AUTO: 83.6 % (ref 40–77)
NEUTS SEG NFR BLD MANUAL: 91 % (ref 40–70)
NITRITE UR QL STRIP: (no result)
PHOSPHATE SERPL-MCNC: 3.8 MG/DL (ref 2.5–4.9)
PLAT MORPH BLD: ADEQUATE
PLATELET # BLD AUTO: 226 K/UL (ref 130–400)
POTASSIUM SERPL-SCNC: 4.3 MMOL/L (ref 3.5–5.1)
PROT UR QL STRIP: 10 MG/DL
RBC # BLD AUTO: 3.02 MIL/UL (ref 4–5.5)
RBC MORPH BLD: (no result)
SP GR UR STRIP: 1.02 (ref 1–1.03)
TSH SERPL DL<=0.05 MIU/L-ACNC: 3.95 UIU/ML (ref 0.36–3.74)
UROBILINOGEN UR STRIP-MCNC: 0.2 MG/DL (ref 0.2–1)
VARIANT LYMPHS NFR BLD MANUAL: 1 % (ref 0–0)
WBC # BLD AUTO: 4.1 K/UL (ref 4.8–10.8)
WBC #/AREA URNS HPF: (no result) /HPF (ref 0–1)
WBC MORPH BLD: NORMAL

## 2025-04-05 RX ADMIN — ENOXAPARIN SODIUM SCH MG: 40 INJECTION SUBCUTANEOUS at 10:15

## 2025-04-05 RX ADMIN — SODIUM CHLORIDE SOLN NEBU 3% ONE ML: 3 NEBU SOLN at 06:59

## 2025-04-05 RX ADMIN — SODIUM CHLORIDE SOLN NEBU 3% ONE ML: 3 NEBU SOLN at 11:36

## 2025-04-05 RX ADMIN — FAMOTIDINE SCH MG: 10 INJECTION INTRAVENOUS at 10:12

## 2025-04-06 VITALS
TEMPERATURE: 98.6 F | RESPIRATION RATE: 16 BRPM | SYSTOLIC BLOOD PRESSURE: 105 MMHG | HEART RATE: 93 BPM | DIASTOLIC BLOOD PRESSURE: 67 MMHG

## 2025-04-06 VITALS — SYSTOLIC BLOOD PRESSURE: 112 MMHG | RESPIRATION RATE: 15 BRPM | DIASTOLIC BLOOD PRESSURE: 69 MMHG | HEART RATE: 90 BPM

## 2025-04-06 VITALS — DIASTOLIC BLOOD PRESSURE: 82 MMHG | SYSTOLIC BLOOD PRESSURE: 113 MMHG | RESPIRATION RATE: 16 BRPM | HEART RATE: 87 BPM

## 2025-04-06 VITALS — OXYGEN SATURATION: 94 % | HEART RATE: 98 BPM | RESPIRATION RATE: 18 BRPM

## 2025-04-06 VITALS
RESPIRATION RATE: 16 BRPM | DIASTOLIC BLOOD PRESSURE: 67 MMHG | HEART RATE: 85 BPM | TEMPERATURE: 98.1 F | SYSTOLIC BLOOD PRESSURE: 114 MMHG

## 2025-04-06 VITALS
DIASTOLIC BLOOD PRESSURE: 63 MMHG | HEART RATE: 86 BPM | TEMPERATURE: 98.3 F | RESPIRATION RATE: 16 BRPM | SYSTOLIC BLOOD PRESSURE: 100 MMHG

## 2025-04-06 VITALS — HEART RATE: 89 BPM | SYSTOLIC BLOOD PRESSURE: 109 MMHG | DIASTOLIC BLOOD PRESSURE: 79 MMHG | RESPIRATION RATE: 16 BRPM

## 2025-04-06 VITALS
DIASTOLIC BLOOD PRESSURE: 68 MMHG | RESPIRATION RATE: 16 BRPM | SYSTOLIC BLOOD PRESSURE: 92 MMHG | HEART RATE: 92 BPM | TEMPERATURE: 99 F

## 2025-04-06 VITALS
SYSTOLIC BLOOD PRESSURE: 92 MMHG | RESPIRATION RATE: 15 BRPM | TEMPERATURE: 97.9 F | DIASTOLIC BLOOD PRESSURE: 51 MMHG | HEART RATE: 83 BPM

## 2025-04-06 VITALS — RESPIRATION RATE: 18 BRPM | HEART RATE: 85 BPM

## 2025-04-06 VITALS — RESPIRATION RATE: 18 BRPM | HEART RATE: 82 BPM | OXYGEN SATURATION: 97 %

## 2025-04-06 VITALS
DIASTOLIC BLOOD PRESSURE: 77 MMHG | RESPIRATION RATE: 16 BRPM | TEMPERATURE: 98.4 F | SYSTOLIC BLOOD PRESSURE: 127 MMHG | HEART RATE: 84 BPM

## 2025-04-06 VITALS
TEMPERATURE: 98.7 F | DIASTOLIC BLOOD PRESSURE: 56 MMHG | HEART RATE: 83 BPM | SYSTOLIC BLOOD PRESSURE: 118 MMHG | RESPIRATION RATE: 18 BRPM

## 2025-04-06 VITALS — HEART RATE: 80 BPM | RESPIRATION RATE: 18 BRPM

## 2025-04-06 VITALS — SYSTOLIC BLOOD PRESSURE: 121 MMHG | HEART RATE: 91 BPM | RESPIRATION RATE: 16 BRPM | DIASTOLIC BLOOD PRESSURE: 80 MMHG

## 2025-04-06 VITALS — OXYGEN SATURATION: 95 %

## 2025-04-06 VITALS
HEART RATE: 92 BPM | RESPIRATION RATE: 15 BRPM | DIASTOLIC BLOOD PRESSURE: 82 MMHG | TEMPERATURE: 97.9 F | SYSTOLIC BLOOD PRESSURE: 124 MMHG

## 2025-04-06 VITALS — RESPIRATION RATE: 15 BRPM | SYSTOLIC BLOOD PRESSURE: 106 MMHG | DIASTOLIC BLOOD PRESSURE: 82 MMHG | HEART RATE: 83 BPM

## 2025-04-06 LAB
BASOPHILS # BLD AUTO: 0.02 K/UL (ref 0–0.2)
BASOPHILS NFR BLD AUTO: 0.5 % (ref 0–5)
CREAT SERPL-MCNC: 0.5 MG/DL (ref 0.5–1)
ERYTHROCYTE [DISTWIDTH] IN BLOOD BY AUTOMATED COUNT: 20.4 % (ref 11–15.5)
HCT VFR BLD AUTO: 26.3 % (ref 36–48)
IMM GRANULOCYTES # BLD: 0.01 K/UL (ref 0–1)
LYMPHOCYTES # SPEC AUTO: 0.6 K/UL (ref 1–4.8)
LYMPHOCYTES NFR SPEC AUTO: 15.7 % (ref 21–51)
MCH RBC QN AUTO: 33.1 PG (ref 27–33)
MCHC RBC AUTO-ENTMCNC: 31.2 G/DL (ref 32–36)
MONOCYTES # BLD AUTO: 0.2 K/UL (ref 0.1–1)
MONOCYTES NFR BLD AUTO: 5.9 % (ref 3–13)
NEUTROPHILS # BLD AUTO: 2.9 K/UL (ref 1.8–7.7)
NEUTROPHILS NFR BLD AUTO: 77.6 % (ref 40–77)
PLATELET # BLD AUTO: 215 K/UL (ref 130–400)
POTASSIUM SERPL-SCNC: 3.3 MMOL/L (ref 3.5–5.1)
RBC # BLD AUTO: 2.48 MIL/UL (ref 4–5.5)
TSH SERPL DL<=0.05 MIU/L-ACNC: 4.34 UIU/ML (ref 0.36–3.74)
WBC # BLD AUTO: 3.7 K/UL (ref 4.8–10.8)

## 2025-04-06 PROCEDURE — 0DJ08ZZ INSPECTION OF UPPER INTESTINAL TRACT, VIA NATURAL OR ARTIFICIAL OPENING ENDOSCOPIC: ICD-10-PCS | Performed by: INTERNAL MEDICINE

## 2025-04-06 RX ADMIN — DEXTROSE, SODIUM CHLORIDE, SODIUM LACTATE, POTASSIUM CHLORIDE, AND CALCIUM CHLORIDE SCH MLS/HR: 5; .6; .31; .03; .02 INJECTION, SOLUTION INTRAVENOUS at 19:53

## 2025-04-07 VITALS — HEART RATE: 77 BPM | RESPIRATION RATE: 20 BRPM

## 2025-04-07 VITALS
HEART RATE: 85 BPM | TEMPERATURE: 98.6 F | RESPIRATION RATE: 16 BRPM | SYSTOLIC BLOOD PRESSURE: 126 MMHG | DIASTOLIC BLOOD PRESSURE: 68 MMHG

## 2025-04-07 VITALS
DIASTOLIC BLOOD PRESSURE: 82 MMHG | RESPIRATION RATE: 16 BRPM | HEART RATE: 76 BPM | TEMPERATURE: 98.6 F | SYSTOLIC BLOOD PRESSURE: 136 MMHG

## 2025-04-07 VITALS
SYSTOLIC BLOOD PRESSURE: 124 MMHG | RESPIRATION RATE: 16 BRPM | DIASTOLIC BLOOD PRESSURE: 72 MMHG | TEMPERATURE: 98.1 F | HEART RATE: 86 BPM

## 2025-04-07 VITALS — RESPIRATION RATE: 19 BRPM | OXYGEN SATURATION: 96 % | HEART RATE: 80 BPM

## 2025-04-07 VITALS — RESPIRATION RATE: 20 BRPM | HEART RATE: 77 BPM | OXYGEN SATURATION: 96 %

## 2025-04-07 VITALS — RESPIRATION RATE: 20 BRPM | HEART RATE: 75 BPM

## 2025-04-07 VITALS
SYSTOLIC BLOOD PRESSURE: 119 MMHG | RESPIRATION RATE: 18 BRPM | DIASTOLIC BLOOD PRESSURE: 74 MMHG | HEART RATE: 71 BPM | TEMPERATURE: 99.2 F

## 2025-04-07 VITALS
HEART RATE: 78 BPM | SYSTOLIC BLOOD PRESSURE: 115 MMHG | DIASTOLIC BLOOD PRESSURE: 101 MMHG | TEMPERATURE: 98.7 F | RESPIRATION RATE: 16 BRPM

## 2025-04-07 VITALS
TEMPERATURE: 98.7 F | DIASTOLIC BLOOD PRESSURE: 87 MMHG | SYSTOLIC BLOOD PRESSURE: 141 MMHG | RESPIRATION RATE: 17 BRPM | HEART RATE: 75 BPM

## 2025-04-07 VITALS — OXYGEN SATURATION: 95 %

## 2025-04-07 VITALS — OXYGEN SATURATION: 97 %

## 2025-04-07 LAB
APTT PPP: 35.5 SEC (ref 26.3–35.5)
CELLS COUNTED: 100
EOSINOPHIL NFR BLD MANUAL: 1 % (ref 1–6)
ERYTHROCYTE [DISTWIDTH] IN BLOOD BY AUTOMATED COUNT: 19.9 % (ref 11–15.5)
HCT VFR BLD AUTO: 26.2 % (ref 36–48)
INR PPP: 1.09 (ref 0.85–1.15)
LYMPHOCYTES NFR BLD MANUAL: 34 % (ref 22–44)
MANUAL DIF COMMENT BLD-IMP: (no result)
MCH RBC QN AUTO: 33.6 PG (ref 27–33)
MCHC RBC AUTO-ENTMCNC: 31.3 G/DL (ref 32–36)
MCV RBC AUTO: 107.4 FL (ref 79–99)
NEUTS SEG NFR BLD MANUAL: 63 % (ref 40–70)
PLAT MORPH BLD: ADEQUATE
PLATELET # BLD AUTO: 193 K/UL (ref 130–400)
PROTHROMBIN TIME: 11.5 SEC (ref 9.6–11.6)
RBC # BLD AUTO: 2.44 MIL/UL (ref 4–5.5)
RBC MORPH BLD: (no result)
VARIANT LYMPHS NFR BLD MANUAL: 2 % (ref 0–0)
WBC # BLD AUTO: 2.1 K/UL (ref 4.8–10.8)
WBC MORPH BLD: (no result)

## 2025-04-07 PROCEDURE — 02HV33Z INSERTION OF INFUSION DEVICE INTO SUPERIOR VENA CAVA, PERCUTANEOUS APPROACH: ICD-10-PCS | Performed by: INTERNAL MEDICINE

## 2025-04-07 RX ADMIN — SODIUM CHLORIDE SOLN NEBU 3% ONE ML: 3 NEBU SOLN at 18:14

## 2025-04-07 RX ADMIN — SUCRALFATE SCH GM: 1 TABLET ORAL at 09:00

## 2025-04-07 RX ADMIN — SODIUM CHLORIDE SOLN NEBU 3% ONE ML: 3 NEBU SOLN at 06:35

## 2025-04-07 RX ADMIN — SODIUM CHLORIDE SOLN NEBU 3% ONE ML: 3 NEBU SOLN at 11:45

## 2025-04-07 RX ADMIN — Medication SCH MG: at 09:00

## 2025-04-07 RX ADMIN — Medication SCH MCG: at 09:00

## 2025-04-07 RX ADMIN — DOXYCYCLINE SCH MLS/HR: 100 INJECTION, POWDER, LYOPHILIZED, FOR SOLUTION INTRAVENOUS at 05:31

## 2025-04-07 RX ADMIN — FLUDROCORTISONE ACETATE SCH MG: 0.1 TABLET ORAL at 09:00

## 2025-04-08 VITALS
HEART RATE: 76 BPM | RESPIRATION RATE: 18 BRPM | DIASTOLIC BLOOD PRESSURE: 81 MMHG | TEMPERATURE: 98.6 F | SYSTOLIC BLOOD PRESSURE: 129 MMHG

## 2025-04-08 VITALS
DIASTOLIC BLOOD PRESSURE: 84 MMHG | SYSTOLIC BLOOD PRESSURE: 130 MMHG | HEART RATE: 71 BPM | TEMPERATURE: 98.8 F | RESPIRATION RATE: 16 BRPM

## 2025-04-08 VITALS — RESPIRATION RATE: 18 BRPM | HEART RATE: 69 BPM | OXYGEN SATURATION: 97 %

## 2025-04-08 VITALS — HEART RATE: 73 BPM | RESPIRATION RATE: 18 BRPM

## 2025-04-08 VITALS — HEART RATE: 72 BPM | RESPIRATION RATE: 18 BRPM

## 2025-04-08 VITALS
HEART RATE: 71 BPM | SYSTOLIC BLOOD PRESSURE: 108 MMHG | RESPIRATION RATE: 18 BRPM | TEMPERATURE: 98.5 F | DIASTOLIC BLOOD PRESSURE: 74 MMHG

## 2025-04-08 VITALS
DIASTOLIC BLOOD PRESSURE: 88 MMHG | HEART RATE: 73 BPM | SYSTOLIC BLOOD PRESSURE: 131 MMHG | TEMPERATURE: 98.6 F | RESPIRATION RATE: 18 BRPM

## 2025-04-08 VITALS
HEART RATE: 80 BPM | SYSTOLIC BLOOD PRESSURE: 129 MMHG | DIASTOLIC BLOOD PRESSURE: 79 MMHG | TEMPERATURE: 98.4 F | RESPIRATION RATE: 17 BRPM

## 2025-04-08 VITALS
DIASTOLIC BLOOD PRESSURE: 99 MMHG | HEART RATE: 75 BPM | SYSTOLIC BLOOD PRESSURE: 150 MMHG | RESPIRATION RATE: 17 BRPM | TEMPERATURE: 98.5 F

## 2025-04-08 VITALS — RESPIRATION RATE: 18 BRPM | OXYGEN SATURATION: 94 % | HEART RATE: 72 BPM

## 2025-04-08 VITALS — HEART RATE: 75 BPM | OXYGEN SATURATION: 95 % | RESPIRATION RATE: 18 BRPM

## 2025-04-08 VITALS — OXYGEN SATURATION: 96 %

## 2025-04-08 VITALS — OXYGEN SATURATION: 98 %

## 2025-04-08 LAB
BASOPHILS # BLD AUTO: 0.02 K/UL (ref 0–0.2)
BASOPHILS NFR BLD AUTO: 0.8 % (ref 0–5)
BILIRUB SERPL-MCNC: 0.2 MG/DL (ref 0.2–1)
CREAT SERPL-MCNC: 0.3 MG/DL (ref 0.5–1)
EOSINOPHIL # BLD AUTO: 0.07 K/UL (ref 0–0.7)
EOSINOPHIL NFR BLD AUTO: 2.6 % (ref 0–8)
ERYTHROCYTE [DISTWIDTH] IN BLOOD BY AUTOMATED COUNT: 19.9 % (ref 11–15.5)
HCT VFR BLD AUTO: 27.9 % (ref 36–48)
IMM GRANULOCYTES # BLD: 0.02 K/UL (ref 0–1)
LYMPHOCYTES # SPEC AUTO: 1.1 K/UL (ref 1–4.8)
LYMPHOCYTES NFR SPEC AUTO: 42.5 % (ref 21–51)
MAGNESIUM SERPL-MCNC: 1.6 MG/DL (ref 1.8–2.4)
MAGNESIUM SERPL-MCNC: 1.7 MG/DL (ref 1.8–2.4)
MCH RBC QN AUTO: 32.6 PG (ref 27–33)
MCHC RBC AUTO-ENTMCNC: 30.1 G/DL (ref 32–36)
MCV RBC AUTO: 108.1 FL (ref 79–99)
MONOCYTES # BLD AUTO: 0.2 K/UL (ref 0.1–1)
MONOCYTES NFR BLD AUTO: 8.6 % (ref 3–13)
NEUTROPHILS # BLD AUTO: 1.2 K/UL (ref 1.8–7.7)
NEUTROPHILS NFR BLD AUTO: 44.7 % (ref 40–77)
PLATELET # BLD AUTO: 195 K/UL (ref 130–400)
POTASSIUM SERPL-SCNC: 2.6 MMOL/L (ref 3.5–5.1)
POTASSIUM SERPL-SCNC: 2.6 MMOL/L (ref 3.5–5.1)
RBC # BLD AUTO: 2.58 MIL/UL (ref 4–5.5)
WBC # BLD AUTO: 2.7 K/UL (ref 4.8–10.8)
WBC MORPH BLD: (no result)

## 2025-04-08 RX ADMIN — MAGNESIUM SULFATE IN WATER PRN MLS/HR: 40 INJECTION, SOLUTION INTRAVENOUS at 05:57

## 2025-04-08 RX ADMIN — DEXTROSE MONOHYDRATE PRN ML: 500 INJECTION PARENTERAL at 16:24

## 2025-04-08 RX ADMIN — SODIUM CHLORIDE SCH GM: 9 INJECTION, SOLUTION INTRAVENOUS at 15:22

## 2025-04-09 VITALS — DIASTOLIC BLOOD PRESSURE: 86 MMHG | SYSTOLIC BLOOD PRESSURE: 141 MMHG | RESPIRATION RATE: 17 BRPM | HEART RATE: 75 BPM

## 2025-04-09 VITALS — HEART RATE: 72 BPM | SYSTOLIC BLOOD PRESSURE: 137 MMHG | DIASTOLIC BLOOD PRESSURE: 85 MMHG | RESPIRATION RATE: 16 BRPM

## 2025-04-09 VITALS
HEART RATE: 70 BPM | DIASTOLIC BLOOD PRESSURE: 81 MMHG | TEMPERATURE: 98.4 F | RESPIRATION RATE: 17 BRPM | SYSTOLIC BLOOD PRESSURE: 124 MMHG

## 2025-04-09 VITALS — SYSTOLIC BLOOD PRESSURE: 139 MMHG | DIASTOLIC BLOOD PRESSURE: 84 MMHG | RESPIRATION RATE: 16 BRPM | HEART RATE: 74 BPM

## 2025-04-09 VITALS — RESPIRATION RATE: 15 BRPM | DIASTOLIC BLOOD PRESSURE: 83 MMHG | SYSTOLIC BLOOD PRESSURE: 142 MMHG | HEART RATE: 72 BPM

## 2025-04-09 VITALS
TEMPERATURE: 97.6 F | SYSTOLIC BLOOD PRESSURE: 128 MMHG | DIASTOLIC BLOOD PRESSURE: 88 MMHG | HEART RATE: 74 BPM | RESPIRATION RATE: 15 BRPM

## 2025-04-09 VITALS
SYSTOLIC BLOOD PRESSURE: 135 MMHG | DIASTOLIC BLOOD PRESSURE: 85 MMHG | RESPIRATION RATE: 17 BRPM | TEMPERATURE: 98.1 F | HEART RATE: 74 BPM

## 2025-04-09 VITALS — RESPIRATION RATE: 18 BRPM | DIASTOLIC BLOOD PRESSURE: 84 MMHG | HEART RATE: 76 BPM | SYSTOLIC BLOOD PRESSURE: 129 MMHG

## 2025-04-09 VITALS
RESPIRATION RATE: 16 BRPM | TEMPERATURE: 98.5 F | SYSTOLIC BLOOD PRESSURE: 148 MMHG | HEART RATE: 81 BPM | DIASTOLIC BLOOD PRESSURE: 78 MMHG

## 2025-04-09 VITALS
TEMPERATURE: 98.9 F | SYSTOLIC BLOOD PRESSURE: 111 MMHG | RESPIRATION RATE: 18 BRPM | DIASTOLIC BLOOD PRESSURE: 73 MMHG | HEART RATE: 80 BPM

## 2025-04-09 VITALS
SYSTOLIC BLOOD PRESSURE: 112 MMHG | RESPIRATION RATE: 18 BRPM | DIASTOLIC BLOOD PRESSURE: 68 MMHG | HEART RATE: 75 BPM | TEMPERATURE: 98 F

## 2025-04-09 VITALS
HEART RATE: 70 BPM | SYSTOLIC BLOOD PRESSURE: 107 MMHG | DIASTOLIC BLOOD PRESSURE: 81 MMHG | TEMPERATURE: 98.4 F | RESPIRATION RATE: 17 BRPM

## 2025-04-09 VITALS — HEART RATE: 78 BPM | RESPIRATION RATE: 18 BRPM | OXYGEN SATURATION: 98 %

## 2025-04-09 VITALS — RESPIRATION RATE: 18 BRPM | HEART RATE: 73 BPM | OXYGEN SATURATION: 93 %

## 2025-04-09 VITALS — HEART RATE: 76 BPM | SYSTOLIC BLOOD PRESSURE: 142 MMHG | DIASTOLIC BLOOD PRESSURE: 85 MMHG | RESPIRATION RATE: 15 BRPM

## 2025-04-09 VITALS — RESPIRATION RATE: 16 BRPM | DIASTOLIC BLOOD PRESSURE: 87 MMHG | SYSTOLIC BLOOD PRESSURE: 144 MMHG | HEART RATE: 75 BPM

## 2025-04-09 VITALS — DIASTOLIC BLOOD PRESSURE: 88 MMHG | RESPIRATION RATE: 18 BRPM | HEART RATE: 73 BPM | SYSTOLIC BLOOD PRESSURE: 138 MMHG

## 2025-04-09 VITALS
SYSTOLIC BLOOD PRESSURE: 143 MMHG | TEMPERATURE: 97.8 F | DIASTOLIC BLOOD PRESSURE: 89 MMHG | RESPIRATION RATE: 18 BRPM | HEART RATE: 72 BPM

## 2025-04-09 VITALS
SYSTOLIC BLOOD PRESSURE: 128 MMHG | TEMPERATURE: 98.4 F | HEART RATE: 72 BPM | DIASTOLIC BLOOD PRESSURE: 87 MMHG | RESPIRATION RATE: 17 BRPM

## 2025-04-09 VITALS — SYSTOLIC BLOOD PRESSURE: 133 MMHG | RESPIRATION RATE: 16 BRPM | DIASTOLIC BLOOD PRESSURE: 89 MMHG | HEART RATE: 74 BPM

## 2025-04-09 VITALS — HEART RATE: 79 BPM | RESPIRATION RATE: 18 BRPM

## 2025-04-09 VITALS
HEART RATE: 85 BPM | SYSTOLIC BLOOD PRESSURE: 119 MMHG | TEMPERATURE: 98.4 F | RESPIRATION RATE: 17 BRPM | DIASTOLIC BLOOD PRESSURE: 83 MMHG

## 2025-04-09 VITALS
SYSTOLIC BLOOD PRESSURE: 106 MMHG | HEART RATE: 80 BPM | TEMPERATURE: 98.4 F | DIASTOLIC BLOOD PRESSURE: 71 MMHG | RESPIRATION RATE: 17 BRPM

## 2025-04-09 VITALS — HEART RATE: 72 BPM | RESPIRATION RATE: 17 BRPM | SYSTOLIC BLOOD PRESSURE: 136 MMHG | DIASTOLIC BLOOD PRESSURE: 87 MMHG

## 2025-04-09 VITALS
HEART RATE: 82 BPM | DIASTOLIC BLOOD PRESSURE: 75 MMHG | TEMPERATURE: 98.6 F | SYSTOLIC BLOOD PRESSURE: 148 MMHG | RESPIRATION RATE: 18 BRPM

## 2025-04-09 VITALS
SYSTOLIC BLOOD PRESSURE: 106 MMHG | TEMPERATURE: 98.4 F | HEART RATE: 71 BPM | RESPIRATION RATE: 17 BRPM | DIASTOLIC BLOOD PRESSURE: 72 MMHG

## 2025-04-09 VITALS
DIASTOLIC BLOOD PRESSURE: 84 MMHG | HEART RATE: 74 BPM | RESPIRATION RATE: 17 BRPM | TEMPERATURE: 98.4 F | SYSTOLIC BLOOD PRESSURE: 119 MMHG

## 2025-04-09 VITALS — HEART RATE: 76 BPM | SYSTOLIC BLOOD PRESSURE: 136 MMHG | RESPIRATION RATE: 18 BRPM | DIASTOLIC BLOOD PRESSURE: 86 MMHG

## 2025-04-09 VITALS — SYSTOLIC BLOOD PRESSURE: 110 MMHG | RESPIRATION RATE: 18 BRPM | HEART RATE: 70 BPM | DIASTOLIC BLOOD PRESSURE: 68 MMHG

## 2025-04-09 VITALS — OXYGEN SATURATION: 92 %

## 2025-04-09 VITALS — OXYGEN SATURATION: 93 %

## 2025-04-09 VITALS — RESPIRATION RATE: 18 BRPM | HEART RATE: 84 BPM

## 2025-04-09 LAB
ALBUMIN SERPL-MCNC: 1.2 G/DL (ref 3.5–5)
BASOPHILS # BLD AUTO: 0.02 K/UL (ref 0–0.2)
BASOPHILS NFR BLD AUTO: 0.8 % (ref 0–5)
BASOPHILS NFR BLD MANUAL: 1 % (ref 0–2)
BILIRUB SERPL-MCNC: 0.3 MG/DL (ref 0.2–1)
CELLS COUNTED: 100
CREAT SERPL-MCNC: 0.4 MG/DL (ref 0.5–1)
EOSINOPHIL # BLD AUTO: 0.06 K/UL (ref 0–0.7)
EOSINOPHIL NFR BLD AUTO: 2.5 % (ref 0–8)
EOSINOPHIL NFR BLD MANUAL: 2 % (ref 1–6)
ERYTHROCYTE [DISTWIDTH] IN BLOOD BY AUTOMATED COUNT: 19.6 % (ref 11–15.5)
HCT VFR BLD AUTO: 33.8 % (ref 36–48)
IMM GRANULOCYTES # BLD: 0.01 K/UL (ref 0–1)
LYMPHOCYTES # SPEC AUTO: 0.8 K/UL (ref 1–4.8)
LYMPHOCYTES NFR BLD MANUAL: 30 % (ref 22–44)
LYMPHOCYTES NFR SPEC AUTO: 34.7 % (ref 21–51)
MANUAL DIF COMMENT BLD-IMP: (no result)
MCH RBC QN AUTO: 32.5 PG (ref 27–33)
MCHC RBC AUTO-ENTMCNC: 30.5 G/DL (ref 32–36)
MCV RBC AUTO: 106.6 FL (ref 79–99)
MONOCYTES # BLD AUTO: 0.2 K/UL (ref 0.1–1)
MONOCYTES NFR BLD AUTO: 8.5 % (ref 3–13)
MONOCYTES NFR BLD MANUAL: 2 % (ref 2–9)
NEUTROPHILS # BLD AUTO: 1.3 K/UL (ref 1.8–7.7)
NEUTROPHILS NFR BLD AUTO: 53.1 % (ref 40–77)
NEUTS SEG NFR BLD MANUAL: 64 % (ref 40–70)
PLAT MORPH BLD: ADEQUATE
PLATELET # BLD AUTO: 218 K/UL (ref 130–400)
POTASSIUM SERPL-SCNC: 3.8 MMOL/L (ref 3.5–5.1)
PROT SERPL-MCNC: 4.7 G/DL (ref 6–8.3)
RBC # BLD AUTO: 3.17 MIL/UL (ref 4–5.5)
RBC MORPH BLD: (no result)
VARIANT LYMPHS NFR BLD MANUAL: 1 % (ref 0–0)
WBC # BLD AUTO: 2.4 K/UL (ref 4.8–10.8)
WBC MORPH BLD: NORMAL

## 2025-04-09 PROCEDURE — 0D758DZ DILATION OF ESOPHAGUS WITH INTRALUMINAL DEVICE, VIA NATURAL OR ARTIFICIAL OPENING ENDOSCOPIC: ICD-10-PCS | Performed by: INTERNAL MEDICINE

## 2025-04-09 RX ADMIN — SODIUM CHLORIDE SCH GM: 9 INJECTION, SOLUTION INTRAVENOUS at 20:00

## 2025-04-09 RX ADMIN — LEUCINE, PHENYLALANINE, LYSINE, METHIONINE, ISOLEUCINE, VALINE, HISTIDINE, THREONINE, TRYPTOPHAN, ALANINE, GLYCINE, ARGININE, PROLINE, SERINE, TYROSINE, SODIUM ACETATE, DIBASIC POTASSIUM PHOSPHATE, MAGNESIUM CHLORIDE, SODIUM CHLORIDE, CALCIUM CHLORIDE, DEXTROSE ONE MLS/HR
365; 280; 290; 200; 300; 290; 240; 210; 90; 1035; 515; 575; 340; 250; 20; 340; 261; 51; 59; 33; 15 INJECTION INTRAVENOUS at 20:01

## 2025-04-10 VITALS — RESPIRATION RATE: 18 BRPM | HEART RATE: 76 BPM | OXYGEN SATURATION: 94 %

## 2025-04-10 VITALS — RESPIRATION RATE: 16 BRPM | HEART RATE: 79 BPM | DIASTOLIC BLOOD PRESSURE: 71 MMHG | SYSTOLIC BLOOD PRESSURE: 120 MMHG

## 2025-04-10 VITALS
RESPIRATION RATE: 14 BRPM | HEART RATE: 95 BPM | SYSTOLIC BLOOD PRESSURE: 122 MMHG | TEMPERATURE: 97.5 F | DIASTOLIC BLOOD PRESSURE: 75 MMHG

## 2025-04-10 VITALS — DIASTOLIC BLOOD PRESSURE: 76 MMHG | SYSTOLIC BLOOD PRESSURE: 123 MMHG | HEART RATE: 76 BPM

## 2025-04-10 VITALS
DIASTOLIC BLOOD PRESSURE: 69 MMHG | HEART RATE: 77 BPM | RESPIRATION RATE: 16 BRPM | TEMPERATURE: 97.9 F | SYSTOLIC BLOOD PRESSURE: 121 MMHG

## 2025-04-10 VITALS — DIASTOLIC BLOOD PRESSURE: 71 MMHG | HEART RATE: 83 BPM | RESPIRATION RATE: 16 BRPM | SYSTOLIC BLOOD PRESSURE: 108 MMHG

## 2025-04-10 VITALS — SYSTOLIC BLOOD PRESSURE: 106 MMHG | DIASTOLIC BLOOD PRESSURE: 58 MMHG | RESPIRATION RATE: 16 BRPM | HEART RATE: 77 BPM

## 2025-04-10 VITALS — HEART RATE: 77 BPM | DIASTOLIC BLOOD PRESSURE: 69 MMHG | SYSTOLIC BLOOD PRESSURE: 118 MMHG | RESPIRATION RATE: 16 BRPM

## 2025-04-10 VITALS
RESPIRATION RATE: 16 BRPM | DIASTOLIC BLOOD PRESSURE: 78 MMHG | HEART RATE: 81 BPM | SYSTOLIC BLOOD PRESSURE: 121 MMHG | TEMPERATURE: 97.5 F

## 2025-04-10 VITALS — SYSTOLIC BLOOD PRESSURE: 117 MMHG | HEART RATE: 79 BPM | DIASTOLIC BLOOD PRESSURE: 69 MMHG | RESPIRATION RATE: 17 BRPM

## 2025-04-10 VITALS — SYSTOLIC BLOOD PRESSURE: 122 MMHG | HEART RATE: 75 BPM | DIASTOLIC BLOOD PRESSURE: 72 MMHG | RESPIRATION RATE: 16 BRPM

## 2025-04-10 VITALS — DIASTOLIC BLOOD PRESSURE: 71 MMHG | RESPIRATION RATE: 16 BRPM | HEART RATE: 75 BPM | SYSTOLIC BLOOD PRESSURE: 131 MMHG

## 2025-04-10 VITALS — HEART RATE: 82 BPM | RESPIRATION RATE: 16 BRPM | DIASTOLIC BLOOD PRESSURE: 75 MMHG | SYSTOLIC BLOOD PRESSURE: 124 MMHG

## 2025-04-10 VITALS — RESPIRATION RATE: 16 BRPM | HEART RATE: 80 BPM | DIASTOLIC BLOOD PRESSURE: 65 MMHG | SYSTOLIC BLOOD PRESSURE: 110 MMHG

## 2025-04-10 VITALS
HEART RATE: 76 BPM | RESPIRATION RATE: 16 BRPM | DIASTOLIC BLOOD PRESSURE: 75 MMHG | TEMPERATURE: 97.6 F | SYSTOLIC BLOOD PRESSURE: 109 MMHG

## 2025-04-10 VITALS
TEMPERATURE: 98 F | RESPIRATION RATE: 18 BRPM | SYSTOLIC BLOOD PRESSURE: 109 MMHG | HEART RATE: 83 BPM | DIASTOLIC BLOOD PRESSURE: 74 MMHG

## 2025-04-10 VITALS — SYSTOLIC BLOOD PRESSURE: 136 MMHG | RESPIRATION RATE: 15 BRPM | HEART RATE: 82 BPM | DIASTOLIC BLOOD PRESSURE: 76 MMHG

## 2025-04-10 VITALS — SYSTOLIC BLOOD PRESSURE: 128 MMHG | DIASTOLIC BLOOD PRESSURE: 74 MMHG | HEART RATE: 89 BPM | RESPIRATION RATE: 15 BRPM

## 2025-04-10 VITALS — OXYGEN SATURATION: 95 %

## 2025-04-10 VITALS — SYSTOLIC BLOOD PRESSURE: 118 MMHG | DIASTOLIC BLOOD PRESSURE: 76 MMHG | HEART RATE: 77 BPM | RESPIRATION RATE: 16 BRPM

## 2025-04-10 VITALS — DIASTOLIC BLOOD PRESSURE: 77 MMHG | RESPIRATION RATE: 16 BRPM | SYSTOLIC BLOOD PRESSURE: 136 MMHG | HEART RATE: 83 BPM

## 2025-04-10 VITALS — HEART RATE: 76 BPM | RESPIRATION RATE: 17 BRPM

## 2025-04-10 VITALS — RESPIRATION RATE: 18 BRPM | HEART RATE: 82 BPM

## 2025-04-10 VITALS
RESPIRATION RATE: 16 BRPM | SYSTOLIC BLOOD PRESSURE: 112 MMHG | HEART RATE: 91 BPM | TEMPERATURE: 99.5 F | DIASTOLIC BLOOD PRESSURE: 73 MMHG

## 2025-04-10 VITALS — SYSTOLIC BLOOD PRESSURE: 112 MMHG | RESPIRATION RATE: 15 BRPM | DIASTOLIC BLOOD PRESSURE: 63 MMHG | HEART RATE: 81 BPM

## 2025-04-10 VITALS — RESPIRATION RATE: 16 BRPM | HEART RATE: 84 BPM | SYSTOLIC BLOOD PRESSURE: 112 MMHG | DIASTOLIC BLOOD PRESSURE: 72 MMHG

## 2025-04-10 VITALS — OXYGEN SATURATION: 94 %

## 2025-04-10 VITALS — RESPIRATION RATE: 18 BRPM | HEART RATE: 76 BPM

## 2025-04-10 VITALS — OXYGEN SATURATION: 96 %

## 2025-04-10 LAB
BASOPHILS # BLD AUTO: 0.02 K/UL (ref 0–0.2)
BASOPHILS NFR BLD AUTO: 0.6 % (ref 0–5)
BILIRUB SERPL-MCNC: 0.2 MG/DL (ref 0.2–1)
CREAT SERPL-MCNC: 0.4 MG/DL (ref 0.5–1)
EOSINOPHIL # BLD AUTO: 0.04 K/UL (ref 0–0.7)
EOSINOPHIL NFR BLD AUTO: 1.1 % (ref 0–8)
ERYTHROCYTE [DISTWIDTH] IN BLOOD BY AUTOMATED COUNT: 19.2 % (ref 11–15.5)
ERYTHROCYTE [DISTWIDTH] IN BLOOD BY AUTOMATED COUNT: 19.3 % (ref 11–15.5)
HCT VFR BLD AUTO: 26.7 % (ref 36–48)
IMM GRANULOCYTES # BLD: 0.02 K/UL (ref 0–1)
LYMPHOCYTES # SPEC AUTO: 0.9 K/UL (ref 1–4.8)
LYMPHOCYTES NFR SPEC AUTO: 25.8 % (ref 21–51)
MAGNESIUM SERPL-MCNC: 1.8 MG/DL (ref 1.8–2.4)
MCH RBC QN AUTO: 32.5 PG (ref 27–33)
MCH RBC QN AUTO: 33.1 PG (ref 27–33)
MCHC RBC AUTO-ENTMCNC: 31.1 G/DL (ref 32–36)
MCHC RBC AUTO-ENTMCNC: 31.2 G/DL (ref 32–36)
MCV RBC AUTO: 104.4 FL (ref 79–99)
MCV RBC AUTO: 106.4 FL (ref 79–99)
MONOCYTES # BLD AUTO: 0.3 K/UL (ref 0.1–1)
MONOCYTES NFR BLD AUTO: 9.2 % (ref 3–13)
NEUTROPHILS # BLD AUTO: 2.2 K/UL (ref 1.8–7.7)
NEUTROPHILS NFR BLD AUTO: 62.7 % (ref 40–77)
PLATELET # BLD AUTO: 204 K/UL (ref 130–400)
PROT SERPL-MCNC: 3.9 G/DL (ref 6–8.3)
RBC # BLD AUTO: 2.49 MIL/UL (ref 4–5.5)
RBC # BLD AUTO: 2.51 MIL/UL (ref 4–5.5)
WBC # BLD AUTO: 3.5 K/UL (ref 4.8–10.8)
WBC # BLD AUTO: 3.8 K/UL (ref 4.8–10.8)

## 2025-04-10 PROCEDURE — 0DH64UZ INSERTION OF FEEDING DEVICE INTO STOMACH, PERCUTANEOUS ENDOSCOPIC APPROACH: ICD-10-PCS | Performed by: SURGERY

## 2025-04-10 PROCEDURE — 8E0W4CZ ROBOTIC ASSISTED PROCEDURE OF TRUNK REGION, PERCUTANEOUS ENDOSCOPIC APPROACH: ICD-10-PCS | Performed by: SURGERY

## 2025-04-10 RX ADMIN — LEUCINE, PHENYLALANINE, LYSINE, METHIONINE, ISOLEUCINE, VALINE, HISTIDINE, THREONINE, TRYPTOPHAN, ALANINE, GLYCINE, ARGININE, PROLINE, SERINE, TYROSINE, SODIUM ACETATE, DIBASIC POTASSIUM PHOSPHATE, MAGNESIUM CHLORIDE, SODIUM CHLORIDE, CALCIUM CHLORIDE, DEXTROSE ONE MLS/HR
311; 238; 247; 170; 255; 247; 204; 179; 77; 880; 438; 489; 289; 213; 17; 297; 261; 51; 77; 33; 5 INJECTION INTRAVENOUS at 22:29

## 2025-04-10 RX ADMIN — SUGAMMADEX ONE MG: 100 INJECTION, SOLUTION INTRAVENOUS at 09:00

## 2025-04-11 VITALS
RESPIRATION RATE: 20 BRPM | DIASTOLIC BLOOD PRESSURE: 64 MMHG | HEART RATE: 85 BPM | SYSTOLIC BLOOD PRESSURE: 131 MMHG | TEMPERATURE: 98.6 F

## 2025-04-11 VITALS
HEART RATE: 98 BPM | DIASTOLIC BLOOD PRESSURE: 81 MMHG | TEMPERATURE: 97.5 F | RESPIRATION RATE: 16 BRPM | SYSTOLIC BLOOD PRESSURE: 119 MMHG

## 2025-04-11 VITALS
RESPIRATION RATE: 17 BRPM | TEMPERATURE: 97.9 F | HEART RATE: 89 BPM | SYSTOLIC BLOOD PRESSURE: 139 MMHG | DIASTOLIC BLOOD PRESSURE: 75 MMHG

## 2025-04-11 VITALS
RESPIRATION RATE: 18 BRPM | SYSTOLIC BLOOD PRESSURE: 132 MMHG | TEMPERATURE: 99.4 F | DIASTOLIC BLOOD PRESSURE: 75 MMHG | HEART RATE: 85 BPM

## 2025-04-11 VITALS
HEART RATE: 84 BPM | TEMPERATURE: 97.9 F | RESPIRATION RATE: 17 BRPM | DIASTOLIC BLOOD PRESSURE: 72 MMHG | SYSTOLIC BLOOD PRESSURE: 133 MMHG

## 2025-04-11 VITALS
RESPIRATION RATE: 16 BRPM | SYSTOLIC BLOOD PRESSURE: 127 MMHG | TEMPERATURE: 99.3 F | HEART RATE: 85 BPM | DIASTOLIC BLOOD PRESSURE: 74 MMHG

## 2025-04-11 VITALS — HEART RATE: 94 BPM | RESPIRATION RATE: 16 BRPM | SYSTOLIC BLOOD PRESSURE: 139 MMHG | DIASTOLIC BLOOD PRESSURE: 87 MMHG

## 2025-04-11 VITALS — HEART RATE: 85 BPM | SYSTOLIC BLOOD PRESSURE: 135 MMHG | DIASTOLIC BLOOD PRESSURE: 79 MMHG | RESPIRATION RATE: 18 BRPM

## 2025-04-11 VITALS — RESPIRATION RATE: 18 BRPM | HEART RATE: 84 BPM | OXYGEN SATURATION: 100 %

## 2025-04-11 VITALS — OXYGEN SATURATION: 100 %

## 2025-04-11 VITALS — HEART RATE: 83 BPM | OXYGEN SATURATION: 96 % | RESPIRATION RATE: 18 BRPM

## 2025-04-11 VITALS — RESPIRATION RATE: 17 BRPM | HEART RATE: 87 BPM | SYSTOLIC BLOOD PRESSURE: 140 MMHG | DIASTOLIC BLOOD PRESSURE: 84 MMHG

## 2025-04-11 VITALS — OXYGEN SATURATION: 96 %

## 2025-04-11 VITALS — SYSTOLIC BLOOD PRESSURE: 141 MMHG | RESPIRATION RATE: 15 BRPM | HEART RATE: 89 BPM | DIASTOLIC BLOOD PRESSURE: 88 MMHG

## 2025-04-11 VITALS — OXYGEN SATURATION: 100 % | HEART RATE: 67 BPM | RESPIRATION RATE: 20 BRPM

## 2025-04-11 VITALS — RESPIRATION RATE: 18 BRPM | HEART RATE: 84 BPM

## 2025-04-11 VITALS — HEART RATE: 95 BPM | SYSTOLIC BLOOD PRESSURE: 125 MMHG | RESPIRATION RATE: 17 BRPM | DIASTOLIC BLOOD PRESSURE: 85 MMHG

## 2025-04-11 LAB
BASOPHILS # BLD AUTO: 0.02 K/UL (ref 0–0.2)
BASOPHILS NFR BLD AUTO: 0.3 % (ref 0–5)
BILIRUB SERPL-MCNC: 0.2 MG/DL (ref 0.2–1)
CREAT SERPL-MCNC: 0.3 MG/DL (ref 0.5–1)
EOSINOPHIL # BLD AUTO: 0.02 K/UL (ref 0–0.7)
EOSINOPHIL NFR BLD AUTO: 0.3 % (ref 0–8)
ERYTHROCYTE [DISTWIDTH] IN BLOOD BY AUTOMATED COUNT: 18.6 % (ref 11–15.5)
HCT VFR BLD AUTO: 28.5 % (ref 36–48)
IMM GRANULOCYTES # BLD: 0.02 K/UL (ref 0–1)
LYMPHOCYTES # SPEC AUTO: 0.9 K/UL (ref 1–4.8)
LYMPHOCYTES NFR SPEC AUTO: 15.3 % (ref 21–51)
MAGNESIUM SERPL-MCNC: 2.1 MG/DL (ref 1.8–2.4)
MCH RBC QN AUTO: 32.2 PG (ref 27–33)
MCHC RBC AUTO-ENTMCNC: 31.2 G/DL (ref 32–36)
MCV RBC AUTO: 103.3 FL (ref 79–99)
MONOCYTES # BLD AUTO: 0.4 K/UL (ref 0.1–1)
MONOCYTES NFR BLD AUTO: 6.5 % (ref 3–13)
NEUTROPHILS # BLD AUTO: 4.5 K/UL (ref 1.8–7.7)
NEUTROPHILS NFR BLD AUTO: 77.3 % (ref 40–77)
PLATELET # BLD AUTO: 205 K/UL (ref 130–400)
POTASSIUM SERPL-SCNC: 3.1 MMOL/L (ref 3.5–5.1)
PROT SERPL-MCNC: 4.1 G/DL (ref 6–8.3)
RBC # BLD AUTO: 2.76 MIL/UL (ref 4–5.5)
WBC # BLD AUTO: 5.8 K/UL (ref 4.8–10.8)

## 2025-04-11 PROCEDURE — 0DCA8ZZ EXTIRPATION OF MATTER FROM JEJUNUM, VIA NATURAL OR ARTIFICIAL OPENING ENDOSCOPIC: ICD-10-PCS | Performed by: INTERNAL MEDICINE

## 2025-04-11 RX ADMIN — POTASSIUM CHLORIDE ONE MEQ: 1500 TABLET, EXTENDED RELEASE ORAL at 14:43

## 2025-04-11 RX ADMIN — DEXTROSE MONOHYDRATE PRN ML: 500 INJECTION PARENTERAL at 20:07

## 2025-04-12 VITALS
RESPIRATION RATE: 18 BRPM | DIASTOLIC BLOOD PRESSURE: 94 MMHG | HEART RATE: 96 BPM | SYSTOLIC BLOOD PRESSURE: 134 MMHG | TEMPERATURE: 96.9 F

## 2025-04-12 VITALS
TEMPERATURE: 98.2 F | DIASTOLIC BLOOD PRESSURE: 78 MMHG | SYSTOLIC BLOOD PRESSURE: 144 MMHG | HEART RATE: 83 BPM | RESPIRATION RATE: 18 BRPM

## 2025-04-12 VITALS
HEART RATE: 77 BPM | DIASTOLIC BLOOD PRESSURE: 83 MMHG | SYSTOLIC BLOOD PRESSURE: 128 MMHG | RESPIRATION RATE: 17 BRPM | TEMPERATURE: 98.8 F

## 2025-04-12 VITALS — OXYGEN SATURATION: 99 % | HEART RATE: 99 BPM | RESPIRATION RATE: 20 BRPM

## 2025-04-12 VITALS — OXYGEN SATURATION: 100 %

## 2025-04-12 VITALS
TEMPERATURE: 97.8 F | SYSTOLIC BLOOD PRESSURE: 125 MMHG | HEART RATE: 95 BPM | RESPIRATION RATE: 20 BRPM | DIASTOLIC BLOOD PRESSURE: 68 MMHG

## 2025-04-12 VITALS
DIASTOLIC BLOOD PRESSURE: 83 MMHG | HEART RATE: 83 BPM | RESPIRATION RATE: 22 BRPM | TEMPERATURE: 98.2 F | SYSTOLIC BLOOD PRESSURE: 135 MMHG

## 2025-04-12 VITALS — OXYGEN SATURATION: 99 % | HEART RATE: 80 BPM | RESPIRATION RATE: 20 BRPM

## 2025-04-12 VITALS
TEMPERATURE: 98.8 F | SYSTOLIC BLOOD PRESSURE: 108 MMHG | DIASTOLIC BLOOD PRESSURE: 66 MMHG | HEART RATE: 78 BPM | RESPIRATION RATE: 18 BRPM

## 2025-04-12 LAB
BASOPHILS # BLD AUTO: 0.03 K/UL (ref 0–0.2)
BASOPHILS NFR BLD AUTO: 0.6 % (ref 0–5)
CREAT SERPL-MCNC: 0.3 MG/DL (ref 0.5–1)
EOSINOPHIL # BLD AUTO: 0.11 K/UL (ref 0–0.7)
EOSINOPHIL NFR BLD AUTO: 2.1 % (ref 0–8)
ERYTHROCYTE [DISTWIDTH] IN BLOOD BY AUTOMATED COUNT: 18.3 % (ref 11–15.5)
HCT VFR BLD AUTO: 29.2 % (ref 36–48)
IMM GRANULOCYTES # BLD: 0.03 K/UL (ref 0–1)
LYMPHOCYTES NFR SPEC AUTO: 18.6 % (ref 21–51)
MCH RBC QN AUTO: 31.9 PG (ref 27–33)
MCHC RBC AUTO-ENTMCNC: 31.2 G/DL (ref 32–36)
MCV RBC AUTO: 102.5 FL (ref 79–99)
MONOCYTES # BLD AUTO: 0.5 K/UL (ref 0.1–1)
MONOCYTES NFR BLD AUTO: 10.4 % (ref 3–13)
NEUTROPHILS # BLD AUTO: 3.5 K/UL (ref 1.8–7.7)
NEUTROPHILS NFR BLD AUTO: 67.7 % (ref 40–77)
PLATELET # BLD AUTO: 206 K/UL (ref 130–400)
POTASSIUM SERPL-SCNC: 3.7 MMOL/L (ref 3.5–5.1)
RBC # BLD AUTO: 2.85 MIL/UL (ref 4–5.5)
WBC # BLD AUTO: 5.2 K/UL (ref 4.8–10.8)

## 2025-04-12 RX ADMIN — BACITRACIN SCH GM: 500 OINTMENT TOPICAL at 09:23

## 2025-04-12 RX ADMIN — LEUCINE, PHENYLALANINE, LYSINE, METHIONINE, ISOLEUCINE, VALINE, HISTIDINE, THREONINE, TRYPTOPHAN, ALANINE, GLYCINE, ARGININE, PROLINE, SERINE, TYROSINE, SODIUM ACETATE, DIBASIC POTASSIUM PHOSPHATE, MAGNESIUM CHLORIDE, SODIUM CHLORIDE, CALCIUM CHLORIDE, DEXTROSE ONE MLS/HR
311; 238; 247; 170; 255; 247; 204; 179; 77; 880; 438; 489; 289; 213; 17; 297; 261; 51; 77; 33; 5 INJECTION INTRAVENOUS at 20:29

## 2025-04-13 VITALS
RESPIRATION RATE: 18 BRPM | SYSTOLIC BLOOD PRESSURE: 129 MMHG | TEMPERATURE: 98.8 F | HEART RATE: 74 BPM | DIASTOLIC BLOOD PRESSURE: 72 MMHG

## 2025-04-13 VITALS
RESPIRATION RATE: 16 BRPM | HEART RATE: 89 BPM | DIASTOLIC BLOOD PRESSURE: 81 MMHG | SYSTOLIC BLOOD PRESSURE: 111 MMHG | TEMPERATURE: 98.9 F

## 2025-04-13 VITALS
TEMPERATURE: 98.8 F | HEART RATE: 83 BPM | RESPIRATION RATE: 18 BRPM | SYSTOLIC BLOOD PRESSURE: 128 MMHG | DIASTOLIC BLOOD PRESSURE: 74 MMHG

## 2025-04-13 VITALS
SYSTOLIC BLOOD PRESSURE: 109 MMHG | DIASTOLIC BLOOD PRESSURE: 70 MMHG | HEART RATE: 91 BPM | TEMPERATURE: 98.9 F | RESPIRATION RATE: 18 BRPM

## 2025-04-13 VITALS — HEART RATE: 82 BPM | OXYGEN SATURATION: 96 % | RESPIRATION RATE: 20 BRPM

## 2025-04-13 VITALS
TEMPERATURE: 98.9 F | DIASTOLIC BLOOD PRESSURE: 81 MMHG | RESPIRATION RATE: 16 BRPM | HEART RATE: 82 BPM | SYSTOLIC BLOOD PRESSURE: 105 MMHG

## 2025-04-13 VITALS
RESPIRATION RATE: 16 BRPM | DIASTOLIC BLOOD PRESSURE: 71 MMHG | TEMPERATURE: 98.9 F | SYSTOLIC BLOOD PRESSURE: 109 MMHG | HEART RATE: 82 BPM

## 2025-04-13 VITALS — RESPIRATION RATE: 20 BRPM | OXYGEN SATURATION: 94 % | HEART RATE: 83 BPM

## 2025-04-13 VITALS — OXYGEN SATURATION: 95 %

## 2025-04-13 VITALS — OXYGEN SATURATION: 96 %

## 2025-04-13 VITALS — OXYGEN SATURATION: 100 %

## 2025-04-13 LAB
BASOPHILS # BLD AUTO: 0.03 K/UL (ref 0–0.2)
BASOPHILS NFR BLD AUTO: 0.7 % (ref 0–5)
BILIRUB SERPL-MCNC: 0.2 MG/DL (ref 0.2–1)
CREAT SERPL-MCNC: 0.3 MG/DL (ref 0.5–1)
EOSINOPHIL # BLD AUTO: 0.23 K/UL (ref 0–0.7)
EOSINOPHIL NFR BLD AUTO: 5.2 % (ref 0–8)
ERYTHROCYTE [DISTWIDTH] IN BLOOD BY AUTOMATED COUNT: 18.1 % (ref 11–15.5)
HCT VFR BLD AUTO: 27.9 % (ref 36–48)
IMM GRANULOCYTES # BLD: 0.02 K/UL (ref 0–1)
LYMPHOCYTES # SPEC AUTO: 0.9 K/UL (ref 1–4.8)
LYMPHOCYTES NFR SPEC AUTO: 20.4 % (ref 21–51)
MAGNESIUM SERPL-MCNC: 1.9 MG/DL (ref 1.8–2.4)
MCH RBC QN AUTO: 31.6 PG (ref 27–33)
MCHC RBC AUTO-ENTMCNC: 30.1 G/DL (ref 32–36)
MCV RBC AUTO: 104.9 FL (ref 79–99)
MONOCYTES # BLD AUTO: 0.5 K/UL (ref 0.1–1)
MONOCYTES NFR BLD AUTO: 11.4 % (ref 3–13)
NEUTROPHILS # BLD AUTO: 2.8 K/UL (ref 1.8–7.7)
NEUTROPHILS NFR BLD AUTO: 61.9 % (ref 40–77)
PLATELET # BLD AUTO: 177 K/UL (ref 130–400)
POTASSIUM SERPL-SCNC: 3.2 MMOL/L (ref 3.5–5.1)
PROT SERPL-MCNC: 4.1 G/DL (ref 6–8.3)
RBC # BLD AUTO: 2.66 MIL/UL (ref 4–5.5)
WBC # BLD AUTO: 4.5 K/UL (ref 4.8–10.8)

## 2025-04-14 VITALS — RESPIRATION RATE: 20 BRPM | HEART RATE: 71 BPM | OXYGEN SATURATION: 95 %

## 2025-04-14 VITALS
SYSTOLIC BLOOD PRESSURE: 114 MMHG | TEMPERATURE: 98.9 F | DIASTOLIC BLOOD PRESSURE: 66 MMHG | RESPIRATION RATE: 18 BRPM | HEART RATE: 86 BPM

## 2025-04-14 VITALS — OXYGEN SATURATION: 96 %

## 2025-04-14 VITALS
DIASTOLIC BLOOD PRESSURE: 79 MMHG | HEART RATE: 84 BPM | SYSTOLIC BLOOD PRESSURE: 124 MMHG | RESPIRATION RATE: 18 BRPM | TEMPERATURE: 98.9 F

## 2025-04-14 VITALS
TEMPERATURE: 98.4 F | SYSTOLIC BLOOD PRESSURE: 108 MMHG | HEART RATE: 85 BPM | RESPIRATION RATE: 16 BRPM | DIASTOLIC BLOOD PRESSURE: 70 MMHG

## 2025-04-14 VITALS — HEART RATE: 71 BPM | RESPIRATION RATE: 18 BRPM

## 2025-04-14 LAB
ALBUMIN SERPL-MCNC: 0.9 G/DL (ref 3.5–5)
BASOPHILS # BLD AUTO: 0.05 K/UL (ref 0–0.2)
BASOPHILS NFR BLD AUTO: 0.9 % (ref 0–5)
BILIRUB SERPL-MCNC: 0.2 MG/DL (ref 0.2–1)
CREAT SERPL-MCNC: 0.2 MG/DL (ref 0.5–1)
EOSINOPHIL # BLD AUTO: 0.18 K/UL (ref 0–0.7)
EOSINOPHIL NFR BLD AUTO: 3.4 % (ref 0–8)
ERYTHROCYTE [DISTWIDTH] IN BLOOD BY AUTOMATED COUNT: 17.6 % (ref 11–15.5)
HCT VFR BLD AUTO: 26.2 % (ref 36–48)
IMM GRANULOCYTES # BLD: 0.02 K/UL (ref 0–1)
LYMPHOCYTES # SPEC AUTO: 0.8 K/UL (ref 1–4.8)
LYMPHOCYTES NFR SPEC AUTO: 15.5 % (ref 21–51)
MAGNESIUM SERPL-MCNC: 1.7 MG/DL (ref 1.8–2.4)
MCH RBC QN AUTO: 32.6 PG (ref 27–33)
MCHC RBC AUTO-ENTMCNC: 32.1 G/DL (ref 32–36)
MCV RBC AUTO: 101.6 FL (ref 79–99)
MONOCYTES # BLD AUTO: 0.6 K/UL (ref 0.1–1)
MONOCYTES NFR BLD AUTO: 11.7 % (ref 3–13)
NEUTROPHILS # BLD AUTO: 3.6 K/UL (ref 1.8–7.7)
NEUTROPHILS NFR BLD AUTO: 68.1 % (ref 40–77)
PLATELET # BLD AUTO: 180 K/UL (ref 130–400)
POTASSIUM SERPL-SCNC: 3.8 MMOL/L (ref 3.5–5.1)
PROT SERPL-MCNC: 4.1 G/DL (ref 6–8.3)
RBC # BLD AUTO: 2.58 MIL/UL (ref 4–5.5)
WBC # BLD AUTO: 5.3 K/UL (ref 4.8–10.8)

## 2025-04-14 PROCEDURE — 05HY33Z INSERTION OF INFUSION DEVICE INTO UPPER VEIN, PERCUTANEOUS APPROACH: ICD-10-PCS | Performed by: INTERNAL MEDICINE

## 2025-04-14 RX ADMIN — MAGNESIUM SULFATE IN WATER PRN MLS/HR: 40 INJECTION, SOLUTION INTRAVENOUS at 05:23

## 2025-05-02 ENCOUNTER — HOSPITAL ENCOUNTER (INPATIENT)
Dept: HOSPITAL 90 - EDH | Age: 70
LOS: 7 days | Discharge: NURSING, REHAB OR LONG-TERM CARE | DRG: 871 | End: 2025-05-09
Attending: INTERNAL MEDICINE | Admitting: INTERNAL MEDICINE
Payer: MEDICARE

## 2025-05-02 VITALS — DIASTOLIC BLOOD PRESSURE: 63 MMHG | HEART RATE: 109 BPM | SYSTOLIC BLOOD PRESSURE: 97 MMHG | RESPIRATION RATE: 15 BRPM

## 2025-05-02 VITALS — HEART RATE: 106 BPM | RESPIRATION RATE: 20 BRPM

## 2025-05-02 VITALS — HEART RATE: 115 BPM | DIASTOLIC BLOOD PRESSURE: 68 MMHG | SYSTOLIC BLOOD PRESSURE: 86 MMHG | RESPIRATION RATE: 16 BRPM

## 2025-05-02 VITALS — HEART RATE: 111 BPM | RESPIRATION RATE: 17 BRPM | SYSTOLIC BLOOD PRESSURE: 99 MMHG | DIASTOLIC BLOOD PRESSURE: 58 MMHG

## 2025-05-02 VITALS — SYSTOLIC BLOOD PRESSURE: 103 MMHG | HEART RATE: 120 BPM | RESPIRATION RATE: 17 BRPM | DIASTOLIC BLOOD PRESSURE: 54 MMHG

## 2025-05-02 VITALS — SYSTOLIC BLOOD PRESSURE: 108 MMHG | RESPIRATION RATE: 19 BRPM | DIASTOLIC BLOOD PRESSURE: 55 MMHG | HEART RATE: 100 BPM

## 2025-05-02 VITALS — HEART RATE: 111 BPM | RESPIRATION RATE: 17 BRPM | DIASTOLIC BLOOD PRESSURE: 65 MMHG | SYSTOLIC BLOOD PRESSURE: 108 MMHG

## 2025-05-02 VITALS — HEART RATE: 102 BPM | TEMPERATURE: 100.3 F | RESPIRATION RATE: 16 BRPM

## 2025-05-02 VITALS — SYSTOLIC BLOOD PRESSURE: 102 MMHG | DIASTOLIC BLOOD PRESSURE: 65 MMHG | RESPIRATION RATE: 20 BRPM | HEART RATE: 128 BPM

## 2025-05-02 VITALS — SYSTOLIC BLOOD PRESSURE: 103 MMHG | DIASTOLIC BLOOD PRESSURE: 61 MMHG | HEART RATE: 108 BPM | RESPIRATION RATE: 18 BRPM

## 2025-05-02 VITALS — HEART RATE: 115 BPM | RESPIRATION RATE: 15 BRPM

## 2025-05-02 VITALS — HEART RATE: 102 BPM | SYSTOLIC BLOOD PRESSURE: 102 MMHG | RESPIRATION RATE: 15 BRPM | DIASTOLIC BLOOD PRESSURE: 61 MMHG

## 2025-05-02 VITALS — RESPIRATION RATE: 17 BRPM | HEART RATE: 107 BPM

## 2025-05-02 VITALS — DIASTOLIC BLOOD PRESSURE: 66 MMHG | HEART RATE: 106 BPM | SYSTOLIC BLOOD PRESSURE: 105 MMHG | RESPIRATION RATE: 16 BRPM

## 2025-05-02 VITALS
SYSTOLIC BLOOD PRESSURE: 115 MMHG | DIASTOLIC BLOOD PRESSURE: 62 MMHG | HEART RATE: 113 BPM | TEMPERATURE: 99.6 F | RESPIRATION RATE: 18 BRPM

## 2025-05-02 VITALS — SYSTOLIC BLOOD PRESSURE: 103 MMHG | HEART RATE: 115 BPM | DIASTOLIC BLOOD PRESSURE: 65 MMHG | RESPIRATION RATE: 16 BRPM

## 2025-05-02 VITALS — RESPIRATION RATE: 18 BRPM | OXYGEN SATURATION: 96 % | HEART RATE: 109 BPM

## 2025-05-02 VITALS — HEART RATE: 102 BPM | RESPIRATION RATE: 17 BRPM | SYSTOLIC BLOOD PRESSURE: 98 MMHG | DIASTOLIC BLOOD PRESSURE: 65 MMHG

## 2025-05-02 VITALS — RESPIRATION RATE: 17 BRPM | HEART RATE: 110 BPM | SYSTOLIC BLOOD PRESSURE: 104 MMHG | DIASTOLIC BLOOD PRESSURE: 69 MMHG

## 2025-05-02 VITALS — RESPIRATION RATE: 19 BRPM | HEART RATE: 106 BPM | OXYGEN SATURATION: 100 %

## 2025-05-02 VITALS — DIASTOLIC BLOOD PRESSURE: 66 MMHG | SYSTOLIC BLOOD PRESSURE: 105 MMHG | HEART RATE: 106 BPM | RESPIRATION RATE: 16 BRPM

## 2025-05-02 VITALS — RESPIRATION RATE: 17 BRPM | DIASTOLIC BLOOD PRESSURE: 64 MMHG | SYSTOLIC BLOOD PRESSURE: 94 MMHG | HEART RATE: 115 BPM

## 2025-05-02 VITALS — DIASTOLIC BLOOD PRESSURE: 67 MMHG | RESPIRATION RATE: 18 BRPM | HEART RATE: 124 BPM | SYSTOLIC BLOOD PRESSURE: 101 MMHG

## 2025-05-02 VITALS — SYSTOLIC BLOOD PRESSURE: 102 MMHG | HEART RATE: 112 BPM | RESPIRATION RATE: 15 BRPM | DIASTOLIC BLOOD PRESSURE: 54 MMHG

## 2025-05-02 VITALS — SYSTOLIC BLOOD PRESSURE: 109 MMHG | HEART RATE: 111 BPM | RESPIRATION RATE: 15 BRPM | DIASTOLIC BLOOD PRESSURE: 63 MMHG

## 2025-05-02 VITALS — HEART RATE: 109 BPM | RESPIRATION RATE: 16 BRPM

## 2025-05-02 VITALS — RESPIRATION RATE: 17 BRPM | HEART RATE: 101 BPM

## 2025-05-02 VITALS — RESPIRATION RATE: 16 BRPM | HEART RATE: 102 BPM

## 2025-05-02 VITALS
TEMPERATURE: 99 F | RESPIRATION RATE: 18 BRPM | SYSTOLIC BLOOD PRESSURE: 103 MMHG | HEART RATE: 111 BPM | DIASTOLIC BLOOD PRESSURE: 66 MMHG

## 2025-05-02 VITALS — RESPIRATION RATE: 17 BRPM | HEART RATE: 97 BPM

## 2025-05-02 VITALS — RESPIRATION RATE: 18 BRPM | HEART RATE: 108 BPM

## 2025-05-02 VITALS — RESPIRATION RATE: 15 BRPM | HEART RATE: 102 BPM

## 2025-05-02 VITALS — DIASTOLIC BLOOD PRESSURE: 59 MMHG | SYSTOLIC BLOOD PRESSURE: 98 MMHG | HEART RATE: 100 BPM | RESPIRATION RATE: 16 BRPM

## 2025-05-02 VITALS — HEART RATE: 106 BPM | RESPIRATION RATE: 17 BRPM

## 2025-05-02 VITALS — DIASTOLIC BLOOD PRESSURE: 58 MMHG | RESPIRATION RATE: 16 BRPM | SYSTOLIC BLOOD PRESSURE: 94 MMHG | HEART RATE: 104 BPM

## 2025-05-02 VITALS — HEART RATE: 109 BPM | RESPIRATION RATE: 19 BRPM | OXYGEN SATURATION: 98 %

## 2025-05-02 VITALS — HEART RATE: 105 BPM | RESPIRATION RATE: 19 BRPM

## 2025-05-02 VITALS — RESPIRATION RATE: 15 BRPM | HEART RATE: 107 BPM

## 2025-05-02 VITALS — RESPIRATION RATE: 16 BRPM | DIASTOLIC BLOOD PRESSURE: 63 MMHG | HEART RATE: 105 BPM | SYSTOLIC BLOOD PRESSURE: 102 MMHG

## 2025-05-02 VITALS — RESPIRATION RATE: 15 BRPM | HEART RATE: 99 BPM

## 2025-05-02 VITALS — HEART RATE: 100 BPM | RESPIRATION RATE: 16 BRPM

## 2025-05-02 VITALS — RESPIRATION RATE: 17 BRPM | HEART RATE: 110 BPM

## 2025-05-02 VITALS — RESPIRATION RATE: 14 BRPM | HEART RATE: 99 BPM

## 2025-05-02 VITALS — RESPIRATION RATE: 15 BRPM | HEART RATE: 103 BPM

## 2025-05-02 DIAGNOSIS — Z95.828: ICD-10-CM

## 2025-05-02 DIAGNOSIS — Z74.01: ICD-10-CM

## 2025-05-02 DIAGNOSIS — K22.2: ICD-10-CM

## 2025-05-02 DIAGNOSIS — R18.8: ICD-10-CM

## 2025-05-02 DIAGNOSIS — Z98.84: ICD-10-CM

## 2025-05-02 DIAGNOSIS — N30.00: ICD-10-CM

## 2025-05-02 DIAGNOSIS — R65.21: ICD-10-CM

## 2025-05-02 DIAGNOSIS — I51.81: ICD-10-CM

## 2025-05-02 DIAGNOSIS — A41.9: Primary | ICD-10-CM

## 2025-05-02 DIAGNOSIS — Z93.1: ICD-10-CM

## 2025-05-02 DIAGNOSIS — K21.9: ICD-10-CM

## 2025-05-02 DIAGNOSIS — R64: ICD-10-CM

## 2025-05-02 DIAGNOSIS — J96.01: ICD-10-CM

## 2025-05-02 DIAGNOSIS — Z86.718: ICD-10-CM

## 2025-05-02 DIAGNOSIS — F03.93: ICD-10-CM

## 2025-05-02 DIAGNOSIS — F41.1: ICD-10-CM

## 2025-05-02 DIAGNOSIS — E43: ICD-10-CM

## 2025-05-02 DIAGNOSIS — K58.9: ICD-10-CM

## 2025-05-02 DIAGNOSIS — Z86.711: ICD-10-CM

## 2025-05-02 DIAGNOSIS — E78.00: ICD-10-CM

## 2025-05-02 DIAGNOSIS — Z79.01: ICD-10-CM

## 2025-05-02 DIAGNOSIS — D69.6: ICD-10-CM

## 2025-05-02 DIAGNOSIS — E03.9: ICD-10-CM

## 2025-05-02 DIAGNOSIS — B37.49: ICD-10-CM

## 2025-05-02 DIAGNOSIS — D64.9: ICD-10-CM

## 2025-05-02 DIAGNOSIS — D73.5: ICD-10-CM

## 2025-05-02 DIAGNOSIS — K55.9: ICD-10-CM

## 2025-05-02 DIAGNOSIS — Z79.899: ICD-10-CM

## 2025-05-02 DIAGNOSIS — F32.A: ICD-10-CM

## 2025-05-02 DIAGNOSIS — D75.839: ICD-10-CM

## 2025-05-02 DIAGNOSIS — I50.33: ICD-10-CM

## 2025-05-02 LAB
APPEARANCE UR: CLEAR
APTT PPP: 30.8 SEC (ref 26.3–35.5)
BACTERIA URNS QL MICRO: (no result) /HPF
BASOPHILS # BLD AUTO: 0.05 K/UL (ref 0–0.2)
BASOPHILS NFR BLD AUTO: 0.2 % (ref 0–5)
BILIRUB UR QL STRIP: NEGATIVE MG/DL
BNP SERPL-MCNC: 951 PG/ML (ref 0–100)
CASTS URNS QL MICRO: 1 /LPF
CELLS COUNTED: 100
COLOR UR: YELLOW
CREAT SERPL-MCNC: 0.4 MG/DL (ref 0.5–1)
DEPRECATED SQUAMOUS URNS QL MICRO: (no result) /HPF (ref 0–2)
EOSINOPHIL # BLD AUTO: 0.03 K/UL (ref 0–0.7)
EOSINOPHIL NFR BLD AUTO: 0.1 % (ref 0–8)
GLUCOSE UR STRIP-MCNC: NEGATIVE MG/DL
HGB UR QL STRIP: NEGATIVE
IMM GRANULOCYTES # BLD: 0.47 K/UL (ref 0–1)
INR PPP: 1.41 (ref 0.85–1.15)
KETONES UR STRIP-MCNC: NEGATIVE MG/DL
LEUKOCYTE ESTERASE UR QL STRIP: 75 LEU/UL
LYMPHOCYTES NFR BLD MANUAL: 6 % (ref 22–44)
LYMPHOCYTES NFR SPEC AUTO: 3.3 % (ref 21–51)
MANUAL DIF COMMENT BLD-IMP: (no result)
MCH RBC QN AUTO: 31.5 PG (ref 27–33)
MCHC RBC AUTO-ENTMCNC: 34.2 G/DL (ref 32–36)
MCV RBC AUTO: 92.3 FL (ref 79–99)
MICRO URNS: YES
MONOCYTES # BLD AUTO: 1.6 K/UL (ref 0.1–1)
MONOCYTES NFR BLD AUTO: 5.4 % (ref 3–13)
MONOCYTES NFR BLD MANUAL: 7 % (ref 2–9)
NEUTROPHILS # BLD AUTO: 27.2 K/UL (ref 1.8–7.7)
NEUTROPHILS NFR BLD AUTO: 89.5 % (ref 40–77)
NEUTS SEG NFR BLD MANUAL: 87 % (ref 40–70)
NITRITE UR QL STRIP: NEGATIVE
PH UR STRIP: 7.5 [PH] (ref 5–8)
PLAT MORPH BLD: (no result)
PLATELET # BLD AUTO: 812 K/UL (ref 130–400)
PROT UR QL STRIP: NEGATIVE MG/DL
PROTHROMBIN TIME: 14.4 SEC (ref 9.6–11.6)
RBC #/AREA URNS HPF: (no result) /HPF (ref 0–1)
RBC MORPH BLD: (no result)
SP GR UR STRIP: 1.01 (ref 1–1.03)
UROBILINOGEN UR STRIP-MCNC: 0.2 MG/DL (ref 0.2–1)
WBC # BLD AUTO: 30.3 K/UL (ref 4.8–10.8)

## 2025-05-02 PROCEDURE — 87040 BLOOD CULTURE FOR BACTERIA: CPT

## 2025-05-02 PROCEDURE — 93308 TTE F-UP OR LMTD: CPT

## 2025-05-02 PROCEDURE — 71045 X-RAY EXAM CHEST 1 VIEW: CPT

## 2025-05-02 PROCEDURE — 82140 ASSAY OF AMMONIA: CPT

## 2025-05-02 PROCEDURE — 83735 ASSAY OF MAGNESIUM: CPT

## 2025-05-02 PROCEDURE — 84145 PROCALCITONIN (PCT): CPT

## 2025-05-02 PROCEDURE — 82550 ASSAY OF CK (CPK): CPT

## 2025-05-02 PROCEDURE — 94664 DEMO&/EVAL PT USE INHALER: CPT

## 2025-05-02 PROCEDURE — 85610 PROTHROMBIN TIME: CPT

## 2025-05-02 PROCEDURE — 80202 ASSAY OF VANCOMYCIN: CPT

## 2025-05-02 PROCEDURE — 86901 BLOOD TYPING SEROLOGIC RH(D): CPT

## 2025-05-02 PROCEDURE — 80053 COMPREHEN METABOLIC PANEL: CPT

## 2025-05-02 PROCEDURE — 92610 EVALUATE SWALLOWING FUNCTION: CPT

## 2025-05-02 PROCEDURE — 83036 HEMOGLOBIN GLYCOSYLATED A1C: CPT

## 2025-05-02 PROCEDURE — 90743 HEPB VACC 2 DOSE ADOLESC IM: CPT

## 2025-05-02 PROCEDURE — 82948 REAGENT STRIP/BLOOD GLUCOSE: CPT

## 2025-05-02 PROCEDURE — 74177 CT ABD & PELVIS W/CONTRAST: CPT

## 2025-05-02 PROCEDURE — 86900 BLOOD TYPING SEROLOGIC ABO: CPT

## 2025-05-02 PROCEDURE — 83605 ASSAY OF LACTIC ACID: CPT

## 2025-05-02 PROCEDURE — 84132 ASSAY OF SERUM POTASSIUM: CPT

## 2025-05-02 PROCEDURE — 90732 PPSV23 VACC 2 YRS+ SUBQ/IM: CPT

## 2025-05-02 PROCEDURE — 36415 COLL VENOUS BLD VENIPUNCTURE: CPT

## 2025-05-02 PROCEDURE — 94640 AIRWAY INHALATION TREATMENT: CPT

## 2025-05-02 PROCEDURE — 87186 SC STD MICRODIL/AGAR DIL: CPT

## 2025-05-02 PROCEDURE — 99291 CRITICAL CARE FIRST HOUR: CPT

## 2025-05-02 PROCEDURE — 85025 COMPLETE CBC W/AUTO DIFF WBC: CPT

## 2025-05-02 PROCEDURE — 80076 HEPATIC FUNCTION PANEL: CPT

## 2025-05-02 PROCEDURE — 85014 HEMATOCRIT: CPT

## 2025-05-02 PROCEDURE — 96367 TX/PROPH/DG ADDL SEQ IV INF: CPT

## 2025-05-02 PROCEDURE — 84484 ASSAY OF TROPONIN QUANT: CPT

## 2025-05-02 PROCEDURE — 36430 TRANSFUSION BLD/BLD COMPNT: CPT

## 2025-05-02 PROCEDURE — 86850 RBC ANTIBODY SCREEN: CPT

## 2025-05-02 PROCEDURE — 86923 COMPATIBILITY TEST ELECTRIC: CPT

## 2025-05-02 PROCEDURE — 96365 THER/PROPH/DIAG IV INF INIT: CPT

## 2025-05-02 PROCEDURE — 85027 COMPLETE CBC AUTOMATED: CPT

## 2025-05-02 PROCEDURE — 93356 MYOCRD STRAIN IMG SPCKL TRCK: CPT

## 2025-05-02 PROCEDURE — 87086 URINE CULTURE/COLONY COUNT: CPT

## 2025-05-02 PROCEDURE — 83880 ASSAY OF NATRIURETIC PEPTIDE: CPT

## 2025-05-02 PROCEDURE — 96375 TX/PRO/DX INJ NEW DRUG ADDON: CPT

## 2025-05-02 PROCEDURE — 93005 ELECTROCARDIOGRAM TRACING: CPT

## 2025-05-02 PROCEDURE — 81001 URINALYSIS AUTO W/SCOPE: CPT

## 2025-05-02 PROCEDURE — 85378 FIBRIN DEGRADE SEMIQUANT: CPT

## 2025-05-02 PROCEDURE — 87804 INFLUENZA ASSAY W/OPTIC: CPT

## 2025-05-02 PROCEDURE — 85730 THROMBOPLASTIN TIME PARTIAL: CPT

## 2025-05-02 PROCEDURE — 85018 HEMOGLOBIN: CPT

## 2025-05-02 PROCEDURE — 80048 BASIC METABOLIC PNL TOTAL CA: CPT

## 2025-05-02 RX ADMIN — Medication SCH MLS/HR: at 15:34

## 2025-05-02 RX ADMIN — SODIUM CHLORIDE SCH MLS/HR: 0.9 INJECTION, SOLUTION INTRAVENOUS at 15:35

## 2025-05-02 RX ADMIN — PIPERACILLIN SODIUM AND TAZOBACTAM SODIUM SCH GM: .375; 3 INJECTION, POWDER, LYOPHILIZED, FOR SOLUTION INTRAVENOUS at 13:05

## 2025-05-02 RX ADMIN — VANCOMYCIN HYDROCHLORIDE ONE MLS/HR: 1 INJECTION, POWDER, LYOPHILIZED, FOR SOLUTION INTRAVENOUS at 16:23

## 2025-05-02 RX ADMIN — FAMOTIDINE SCH MG: 10 INJECTION INTRAVENOUS at 19:48

## 2025-05-03 VITALS — RESPIRATION RATE: 21 BRPM | HEART RATE: 96 BPM

## 2025-05-03 VITALS — HEART RATE: 96 BPM | RESPIRATION RATE: 12 BRPM

## 2025-05-03 VITALS
RESPIRATION RATE: 13 BRPM | HEART RATE: 103 BPM | SYSTOLIC BLOOD PRESSURE: 114 MMHG | DIASTOLIC BLOOD PRESSURE: 60 MMHG | TEMPERATURE: 98.2 F

## 2025-05-03 VITALS — SYSTOLIC BLOOD PRESSURE: 116 MMHG | DIASTOLIC BLOOD PRESSURE: 80 MMHG | HEART RATE: 95 BPM | RESPIRATION RATE: 16 BRPM

## 2025-05-03 VITALS — RESPIRATION RATE: 11 BRPM | DIASTOLIC BLOOD PRESSURE: 61 MMHG | SYSTOLIC BLOOD PRESSURE: 115 MMHG | HEART RATE: 96 BPM

## 2025-05-03 VITALS — RESPIRATION RATE: 14 BRPM | HEART RATE: 106 BPM | SYSTOLIC BLOOD PRESSURE: 115 MMHG | DIASTOLIC BLOOD PRESSURE: 60 MMHG

## 2025-05-03 VITALS — DIASTOLIC BLOOD PRESSURE: 62 MMHG | SYSTOLIC BLOOD PRESSURE: 117 MMHG | RESPIRATION RATE: 12 BRPM | HEART RATE: 92 BPM

## 2025-05-03 VITALS — RESPIRATION RATE: 13 BRPM | HEART RATE: 97 BPM | DIASTOLIC BLOOD PRESSURE: 77 MMHG | SYSTOLIC BLOOD PRESSURE: 114 MMHG

## 2025-05-03 VITALS — RESPIRATION RATE: 18 BRPM | SYSTOLIC BLOOD PRESSURE: 106 MMHG | HEART RATE: 91 BPM | DIASTOLIC BLOOD PRESSURE: 65 MMHG

## 2025-05-03 VITALS — RESPIRATION RATE: 13 BRPM | HEART RATE: 97 BPM

## 2025-05-03 VITALS — RESPIRATION RATE: 17 BRPM | HEART RATE: 95 BPM

## 2025-05-03 VITALS — HEART RATE: 105 BPM | SYSTOLIC BLOOD PRESSURE: 104 MMHG | RESPIRATION RATE: 13 BRPM | DIASTOLIC BLOOD PRESSURE: 65 MMHG

## 2025-05-03 VITALS — HEART RATE: 106 BPM | RESPIRATION RATE: 27 BRPM

## 2025-05-03 VITALS — SYSTOLIC BLOOD PRESSURE: 104 MMHG | DIASTOLIC BLOOD PRESSURE: 69 MMHG | RESPIRATION RATE: 16 BRPM | HEART RATE: 106 BPM

## 2025-05-03 VITALS — HEART RATE: 99 BPM | DIASTOLIC BLOOD PRESSURE: 72 MMHG | SYSTOLIC BLOOD PRESSURE: 100 MMHG | RESPIRATION RATE: 12 BRPM

## 2025-05-03 VITALS — RESPIRATION RATE: 11 BRPM | HEART RATE: 103 BPM

## 2025-05-03 VITALS — HEART RATE: 101 BPM | SYSTOLIC BLOOD PRESSURE: 117 MMHG | DIASTOLIC BLOOD PRESSURE: 70 MMHG | RESPIRATION RATE: 18 BRPM

## 2025-05-03 VITALS — RESPIRATION RATE: 13 BRPM | HEART RATE: 94 BPM | SYSTOLIC BLOOD PRESSURE: 120 MMHG | DIASTOLIC BLOOD PRESSURE: 77 MMHG

## 2025-05-03 VITALS — DIASTOLIC BLOOD PRESSURE: 61 MMHG | RESPIRATION RATE: 13 BRPM | SYSTOLIC BLOOD PRESSURE: 106 MMHG | HEART RATE: 94 BPM

## 2025-05-03 VITALS — RESPIRATION RATE: 12 BRPM | HEART RATE: 106 BPM

## 2025-05-03 VITALS — DIASTOLIC BLOOD PRESSURE: 73 MMHG | HEART RATE: 92 BPM | RESPIRATION RATE: 14 BRPM | SYSTOLIC BLOOD PRESSURE: 118 MMHG

## 2025-05-03 VITALS — SYSTOLIC BLOOD PRESSURE: 118 MMHG | RESPIRATION RATE: 16 BRPM | HEART RATE: 90 BPM | DIASTOLIC BLOOD PRESSURE: 68 MMHG

## 2025-05-03 VITALS — SYSTOLIC BLOOD PRESSURE: 101 MMHG | DIASTOLIC BLOOD PRESSURE: 63 MMHG | HEART RATE: 112 BPM | RESPIRATION RATE: 15 BRPM

## 2025-05-03 VITALS — HEART RATE: 95 BPM | SYSTOLIC BLOOD PRESSURE: 105 MMHG | DIASTOLIC BLOOD PRESSURE: 67 MMHG | RESPIRATION RATE: 14 BRPM

## 2025-05-03 VITALS — DIASTOLIC BLOOD PRESSURE: 61 MMHG | HEART RATE: 100 BPM | SYSTOLIC BLOOD PRESSURE: 100 MMHG | RESPIRATION RATE: 14 BRPM

## 2025-05-03 VITALS — HEART RATE: 102 BPM | RESPIRATION RATE: 18 BRPM

## 2025-05-03 VITALS — DIASTOLIC BLOOD PRESSURE: 76 MMHG | RESPIRATION RATE: 14 BRPM | SYSTOLIC BLOOD PRESSURE: 114 MMHG | HEART RATE: 85 BPM

## 2025-05-03 VITALS — DIASTOLIC BLOOD PRESSURE: 73 MMHG | RESPIRATION RATE: 13 BRPM | SYSTOLIC BLOOD PRESSURE: 113 MMHG | HEART RATE: 93 BPM

## 2025-05-03 VITALS — SYSTOLIC BLOOD PRESSURE: 110 MMHG | HEART RATE: 96 BPM | DIASTOLIC BLOOD PRESSURE: 74 MMHG | RESPIRATION RATE: 12 BRPM

## 2025-05-03 VITALS — HEART RATE: 109 BPM | RESPIRATION RATE: 15 BRPM

## 2025-05-03 VITALS — HEART RATE: 99 BPM | SYSTOLIC BLOOD PRESSURE: 127 MMHG | DIASTOLIC BLOOD PRESSURE: 79 MMHG | RESPIRATION RATE: 23 BRPM

## 2025-05-03 VITALS — RESPIRATION RATE: 14 BRPM | HEART RATE: 105 BPM

## 2025-05-03 VITALS — RESPIRATION RATE: 14 BRPM | DIASTOLIC BLOOD PRESSURE: 69 MMHG | SYSTOLIC BLOOD PRESSURE: 110 MMHG | HEART RATE: 101 BPM

## 2025-05-03 VITALS — HEART RATE: 101 BPM | RESPIRATION RATE: 12 BRPM

## 2025-05-03 VITALS — HEART RATE: 94 BPM | RESPIRATION RATE: 11 BRPM

## 2025-05-03 VITALS — HEART RATE: 95 BPM | RESPIRATION RATE: 12 BRPM

## 2025-05-03 VITALS — HEART RATE: 93 BPM | RESPIRATION RATE: 14 BRPM

## 2025-05-03 VITALS — DIASTOLIC BLOOD PRESSURE: 65 MMHG | HEART RATE: 97 BPM | SYSTOLIC BLOOD PRESSURE: 114 MMHG | RESPIRATION RATE: 14 BRPM

## 2025-05-03 VITALS — RESPIRATION RATE: 14 BRPM | HEART RATE: 97 BPM

## 2025-05-03 VITALS — HEART RATE: 94 BPM | RESPIRATION RATE: 15 BRPM

## 2025-05-03 VITALS — SYSTOLIC BLOOD PRESSURE: 116 MMHG | HEART RATE: 96 BPM | DIASTOLIC BLOOD PRESSURE: 69 MMHG | RESPIRATION RATE: 16 BRPM

## 2025-05-03 VITALS — HEART RATE: 94 BPM | SYSTOLIC BLOOD PRESSURE: 98 MMHG | RESPIRATION RATE: 15 BRPM | DIASTOLIC BLOOD PRESSURE: 52 MMHG

## 2025-05-03 VITALS — DIASTOLIC BLOOD PRESSURE: 70 MMHG | RESPIRATION RATE: 12 BRPM | HEART RATE: 98 BPM | SYSTOLIC BLOOD PRESSURE: 116 MMHG

## 2025-05-03 VITALS — RESPIRATION RATE: 14 BRPM | HEART RATE: 98 BPM

## 2025-05-03 VITALS — OXYGEN SATURATION: 98 % | RESPIRATION RATE: 19 BRPM | HEART RATE: 99 BPM

## 2025-05-03 VITALS — SYSTOLIC BLOOD PRESSURE: 113 MMHG | HEART RATE: 96 BPM | DIASTOLIC BLOOD PRESSURE: 68 MMHG | RESPIRATION RATE: 13 BRPM

## 2025-05-03 VITALS — HEART RATE: 101 BPM | RESPIRATION RATE: 11 BRPM

## 2025-05-03 VITALS — SYSTOLIC BLOOD PRESSURE: 125 MMHG | HEART RATE: 108 BPM | DIASTOLIC BLOOD PRESSURE: 65 MMHG | RESPIRATION RATE: 12 BRPM

## 2025-05-03 VITALS — SYSTOLIC BLOOD PRESSURE: 122 MMHG | DIASTOLIC BLOOD PRESSURE: 74 MMHG | RESPIRATION RATE: 12 BRPM | HEART RATE: 97 BPM

## 2025-05-03 VITALS — RESPIRATION RATE: 15 BRPM | HEART RATE: 97 BPM

## 2025-05-03 VITALS — RESPIRATION RATE: 20 BRPM | HEART RATE: 102 BPM | OXYGEN SATURATION: 98 %

## 2025-05-03 VITALS — RESPIRATION RATE: 14 BRPM | HEART RATE: 102 BPM | SYSTOLIC BLOOD PRESSURE: 97 MMHG | DIASTOLIC BLOOD PRESSURE: 67 MMHG

## 2025-05-03 VITALS — RESPIRATION RATE: 12 BRPM | HEART RATE: 96 BPM

## 2025-05-03 VITALS — RESPIRATION RATE: 13 BRPM | HEART RATE: 105 BPM

## 2025-05-03 VITALS — DIASTOLIC BLOOD PRESSURE: 74 MMHG | HEART RATE: 95 BPM | SYSTOLIC BLOOD PRESSURE: 115 MMHG | RESPIRATION RATE: 12 BRPM

## 2025-05-03 VITALS — HEART RATE: 93 BPM | SYSTOLIC BLOOD PRESSURE: 108 MMHG | DIASTOLIC BLOOD PRESSURE: 57 MMHG | RESPIRATION RATE: 12 BRPM

## 2025-05-03 VITALS — SYSTOLIC BLOOD PRESSURE: 110 MMHG | RESPIRATION RATE: 13 BRPM | DIASTOLIC BLOOD PRESSURE: 69 MMHG | HEART RATE: 103 BPM

## 2025-05-03 VITALS — HEART RATE: 102 BPM | RESPIRATION RATE: 12 BRPM

## 2025-05-03 VITALS — SYSTOLIC BLOOD PRESSURE: 83 MMHG | DIASTOLIC BLOOD PRESSURE: 54 MMHG | HEART RATE: 99 BPM | RESPIRATION RATE: 14 BRPM

## 2025-05-03 VITALS — RESPIRATION RATE: 14 BRPM | HEART RATE: 101 BPM

## 2025-05-03 VITALS — DIASTOLIC BLOOD PRESSURE: 69 MMHG | HEART RATE: 105 BPM | RESPIRATION RATE: 14 BRPM | SYSTOLIC BLOOD PRESSURE: 101 MMHG

## 2025-05-03 VITALS
DIASTOLIC BLOOD PRESSURE: 80 MMHG | SYSTOLIC BLOOD PRESSURE: 120 MMHG | HEART RATE: 89 BPM | TEMPERATURE: 98.8 F | RESPIRATION RATE: 13 BRPM

## 2025-05-03 VITALS — HEART RATE: 96 BPM | DIASTOLIC BLOOD PRESSURE: 68 MMHG | RESPIRATION RATE: 12 BRPM | SYSTOLIC BLOOD PRESSURE: 114 MMHG

## 2025-05-03 VITALS — HEART RATE: 98 BPM | RESPIRATION RATE: 21 BRPM

## 2025-05-03 VITALS — DIASTOLIC BLOOD PRESSURE: 70 MMHG | RESPIRATION RATE: 18 BRPM | HEART RATE: 91 BPM | SYSTOLIC BLOOD PRESSURE: 115 MMHG

## 2025-05-03 VITALS — RESPIRATION RATE: 13 BRPM | SYSTOLIC BLOOD PRESSURE: 88 MMHG | HEART RATE: 106 BPM | DIASTOLIC BLOOD PRESSURE: 63 MMHG

## 2025-05-03 VITALS — RESPIRATION RATE: 11 BRPM | DIASTOLIC BLOOD PRESSURE: 59 MMHG | SYSTOLIC BLOOD PRESSURE: 105 MMHG | HEART RATE: 95 BPM

## 2025-05-03 VITALS — HEART RATE: 99 BPM | RESPIRATION RATE: 17 BRPM

## 2025-05-03 VITALS — RESPIRATION RATE: 15 BRPM | DIASTOLIC BLOOD PRESSURE: 61 MMHG | HEART RATE: 98 BPM | SYSTOLIC BLOOD PRESSURE: 89 MMHG

## 2025-05-03 VITALS — HEART RATE: 106 BPM | RESPIRATION RATE: 20 BRPM | DIASTOLIC BLOOD PRESSURE: 73 MMHG | SYSTOLIC BLOOD PRESSURE: 124 MMHG

## 2025-05-03 VITALS — HEART RATE: 95 BPM | RESPIRATION RATE: 14 BRPM

## 2025-05-03 VITALS — HEART RATE: 100 BPM | SYSTOLIC BLOOD PRESSURE: 99 MMHG | DIASTOLIC BLOOD PRESSURE: 50 MMHG | RESPIRATION RATE: 17 BRPM

## 2025-05-03 VITALS — SYSTOLIC BLOOD PRESSURE: 122 MMHG | RESPIRATION RATE: 14 BRPM | DIASTOLIC BLOOD PRESSURE: 76 MMHG | HEART RATE: 85 BPM

## 2025-05-03 VITALS — HEART RATE: 94 BPM | SYSTOLIC BLOOD PRESSURE: 102 MMHG | RESPIRATION RATE: 13 BRPM | DIASTOLIC BLOOD PRESSURE: 50 MMHG

## 2025-05-03 VITALS — DIASTOLIC BLOOD PRESSURE: 70 MMHG | HEART RATE: 100 BPM | SYSTOLIC BLOOD PRESSURE: 104 MMHG | RESPIRATION RATE: 12 BRPM

## 2025-05-03 VITALS — RESPIRATION RATE: 13 BRPM | TEMPERATURE: 98.9 F | HEART RATE: 96 BPM

## 2025-05-03 VITALS — RESPIRATION RATE: 15 BRPM | HEART RATE: 112 BPM

## 2025-05-03 VITALS — SYSTOLIC BLOOD PRESSURE: 123 MMHG | DIASTOLIC BLOOD PRESSURE: 76 MMHG | RESPIRATION RATE: 14 BRPM | HEART RATE: 99 BPM

## 2025-05-03 VITALS — SYSTOLIC BLOOD PRESSURE: 114 MMHG | HEART RATE: 103 BPM | RESPIRATION RATE: 25 BRPM | DIASTOLIC BLOOD PRESSURE: 64 MMHG

## 2025-05-03 VITALS — RESPIRATION RATE: 12 BRPM | HEART RATE: 95 BPM

## 2025-05-03 VITALS — SYSTOLIC BLOOD PRESSURE: 116 MMHG | HEART RATE: 95 BPM | DIASTOLIC BLOOD PRESSURE: 72 MMHG | RESPIRATION RATE: 15 BRPM

## 2025-05-03 VITALS — HEART RATE: 101 BPM | RESPIRATION RATE: 14 BRPM

## 2025-05-03 VITALS — RESPIRATION RATE: 13 BRPM | HEART RATE: 93 BPM

## 2025-05-03 VITALS — RESPIRATION RATE: 20 BRPM | DIASTOLIC BLOOD PRESSURE: 62 MMHG | SYSTOLIC BLOOD PRESSURE: 138 MMHG | HEART RATE: 104 BPM

## 2025-05-03 VITALS — RESPIRATION RATE: 12 BRPM | SYSTOLIC BLOOD PRESSURE: 114 MMHG | HEART RATE: 96 BPM | DIASTOLIC BLOOD PRESSURE: 74 MMHG

## 2025-05-03 VITALS — HEART RATE: 95 BPM | RESPIRATION RATE: 17 BRPM

## 2025-05-03 VITALS — SYSTOLIC BLOOD PRESSURE: 116 MMHG | DIASTOLIC BLOOD PRESSURE: 69 MMHG | RESPIRATION RATE: 14 BRPM | HEART RATE: 95 BPM

## 2025-05-03 VITALS — HEART RATE: 101 BPM | RESPIRATION RATE: 16 BRPM

## 2025-05-03 VITALS — RESPIRATION RATE: 14 BRPM | HEART RATE: 99 BPM

## 2025-05-03 VITALS — HEART RATE: 105 BPM | RESPIRATION RATE: 13 BRPM

## 2025-05-03 VITALS — SYSTOLIC BLOOD PRESSURE: 101 MMHG | DIASTOLIC BLOOD PRESSURE: 52 MMHG | HEART RATE: 107 BPM | RESPIRATION RATE: 17 BRPM

## 2025-05-03 VITALS — HEART RATE: 96 BPM | RESPIRATION RATE: 18 BRPM

## 2025-05-03 VITALS — RESPIRATION RATE: 15 BRPM | DIASTOLIC BLOOD PRESSURE: 56 MMHG | HEART RATE: 98 BPM | SYSTOLIC BLOOD PRESSURE: 93 MMHG

## 2025-05-03 VITALS — HEART RATE: 101 BPM | RESPIRATION RATE: 15 BRPM

## 2025-05-03 VITALS — HEART RATE: 96 BPM | SYSTOLIC BLOOD PRESSURE: 109 MMHG | DIASTOLIC BLOOD PRESSURE: 61 MMHG | RESPIRATION RATE: 13 BRPM

## 2025-05-03 VITALS — DIASTOLIC BLOOD PRESSURE: 64 MMHG | HEART RATE: 95 BPM | SYSTOLIC BLOOD PRESSURE: 116 MMHG | RESPIRATION RATE: 13 BRPM

## 2025-05-03 VITALS — RESPIRATION RATE: 13 BRPM | HEART RATE: 96 BPM | DIASTOLIC BLOOD PRESSURE: 68 MMHG | SYSTOLIC BLOOD PRESSURE: 116 MMHG

## 2025-05-03 VITALS — HEART RATE: 102 BPM | RESPIRATION RATE: 16 BRPM | DIASTOLIC BLOOD PRESSURE: 63 MMHG | SYSTOLIC BLOOD PRESSURE: 103 MMHG

## 2025-05-03 VITALS — RESPIRATION RATE: 15 BRPM | HEART RATE: 100 BPM | TEMPERATURE: 99.9 F

## 2025-05-03 VITALS — RESPIRATION RATE: 17 BRPM | HEART RATE: 94 BPM | DIASTOLIC BLOOD PRESSURE: 77 MMHG | SYSTOLIC BLOOD PRESSURE: 114 MMHG

## 2025-05-03 VITALS — SYSTOLIC BLOOD PRESSURE: 111 MMHG | HEART RATE: 98 BPM | DIASTOLIC BLOOD PRESSURE: 68 MMHG | RESPIRATION RATE: 13 BRPM

## 2025-05-03 VITALS — RESPIRATION RATE: 13 BRPM | SYSTOLIC BLOOD PRESSURE: 129 MMHG | DIASTOLIC BLOOD PRESSURE: 52 MMHG | HEART RATE: 98 BPM

## 2025-05-03 VITALS — SYSTOLIC BLOOD PRESSURE: 97 MMHG | DIASTOLIC BLOOD PRESSURE: 52 MMHG | RESPIRATION RATE: 13 BRPM | HEART RATE: 99 BPM

## 2025-05-03 VITALS — RESPIRATION RATE: 14 BRPM | HEART RATE: 99 BPM | DIASTOLIC BLOOD PRESSURE: 54 MMHG | SYSTOLIC BLOOD PRESSURE: 94 MMHG

## 2025-05-03 VITALS — HEART RATE: 91 BPM | RESPIRATION RATE: 13 BRPM | DIASTOLIC BLOOD PRESSURE: 66 MMHG | SYSTOLIC BLOOD PRESSURE: 124 MMHG

## 2025-05-03 VITALS — RESPIRATION RATE: 16 BRPM | HEART RATE: 93 BPM

## 2025-05-03 VITALS — HEART RATE: 95 BPM | SYSTOLIC BLOOD PRESSURE: 116 MMHG | RESPIRATION RATE: 13 BRPM | DIASTOLIC BLOOD PRESSURE: 79 MMHG

## 2025-05-03 VITALS — RESPIRATION RATE: 19 BRPM | HEART RATE: 112 BPM

## 2025-05-03 VITALS — DIASTOLIC BLOOD PRESSURE: 53 MMHG | HEART RATE: 101 BPM | RESPIRATION RATE: 14 BRPM | SYSTOLIC BLOOD PRESSURE: 96 MMHG

## 2025-05-03 VITALS — RESPIRATION RATE: 13 BRPM | DIASTOLIC BLOOD PRESSURE: 67 MMHG | SYSTOLIC BLOOD PRESSURE: 120 MMHG | HEART RATE: 100 BPM

## 2025-05-03 VITALS — HEART RATE: 97 BPM | RESPIRATION RATE: 13 BRPM

## 2025-05-03 VITALS — RESPIRATION RATE: 13 BRPM | HEART RATE: 99 BPM

## 2025-05-03 VITALS — HEART RATE: 95 BPM | RESPIRATION RATE: 13 BRPM

## 2025-05-03 VITALS — RESPIRATION RATE: 13 BRPM | HEART RATE: 95 BPM

## 2025-05-03 VITALS — RESPIRATION RATE: 19 BRPM | HEART RATE: 92 BPM | OXYGEN SATURATION: 99 %

## 2025-05-03 VITALS — SYSTOLIC BLOOD PRESSURE: 113 MMHG | HEART RATE: 97 BPM | DIASTOLIC BLOOD PRESSURE: 63 MMHG | RESPIRATION RATE: 14 BRPM

## 2025-05-03 VITALS — HEART RATE: 117 BPM | RESPIRATION RATE: 15 BRPM

## 2025-05-03 VITALS — RESPIRATION RATE: 13 BRPM | HEART RATE: 94 BPM

## 2025-05-03 VITALS — RESPIRATION RATE: 12 BRPM | HEART RATE: 101 BPM

## 2025-05-03 VITALS — HEART RATE: 104 BPM | RESPIRATION RATE: 16 BRPM

## 2025-05-03 VITALS — SYSTOLIC BLOOD PRESSURE: 123 MMHG | RESPIRATION RATE: 16 BRPM | DIASTOLIC BLOOD PRESSURE: 75 MMHG | HEART RATE: 93 BPM

## 2025-05-03 VITALS — RESPIRATION RATE: 14 BRPM | HEART RATE: 94 BPM

## 2025-05-03 VITALS — HEART RATE: 93 BPM | SYSTOLIC BLOOD PRESSURE: 126 MMHG | RESPIRATION RATE: 16 BRPM | DIASTOLIC BLOOD PRESSURE: 66 MMHG

## 2025-05-03 VITALS — RESPIRATION RATE: 14 BRPM | HEART RATE: 100 BPM

## 2025-05-03 VITALS — HEART RATE: 94 BPM | RESPIRATION RATE: 14 BRPM | SYSTOLIC BLOOD PRESSURE: 107 MMHG | DIASTOLIC BLOOD PRESSURE: 76 MMHG

## 2025-05-03 VITALS — HEART RATE: 94 BPM | RESPIRATION RATE: 12 BRPM | DIASTOLIC BLOOD PRESSURE: 67 MMHG | SYSTOLIC BLOOD PRESSURE: 121 MMHG

## 2025-05-03 VITALS — RESPIRATION RATE: 17 BRPM | DIASTOLIC BLOOD PRESSURE: 73 MMHG | HEART RATE: 91 BPM | SYSTOLIC BLOOD PRESSURE: 114 MMHG

## 2025-05-03 VITALS — HEART RATE: 94 BPM | RESPIRATION RATE: 17 BRPM

## 2025-05-03 VITALS — HEART RATE: 96 BPM | RESPIRATION RATE: 14 BRPM | SYSTOLIC BLOOD PRESSURE: 119 MMHG | DIASTOLIC BLOOD PRESSURE: 87 MMHG

## 2025-05-03 VITALS — HEART RATE: 97 BPM | RESPIRATION RATE: 15 BRPM

## 2025-05-03 VITALS — HEART RATE: 98 BPM | RESPIRATION RATE: 15 BRPM

## 2025-05-03 VITALS — RESPIRATION RATE: 13 BRPM | HEART RATE: 107 BPM

## 2025-05-03 VITALS — RESPIRATION RATE: 15 BRPM | HEART RATE: 101 BPM

## 2025-05-03 VITALS — HEART RATE: 103 BPM | RESPIRATION RATE: 14 BRPM

## 2025-05-03 VITALS — RESPIRATION RATE: 13 BRPM | HEART RATE: 96 BPM

## 2025-05-03 VITALS — RESPIRATION RATE: 13 BRPM | HEART RATE: 98 BPM

## 2025-05-03 VITALS — HEART RATE: 92 BPM | RESPIRATION RATE: 13 BRPM

## 2025-05-03 VITALS — SYSTOLIC BLOOD PRESSURE: 111 MMHG | HEART RATE: 87 BPM | DIASTOLIC BLOOD PRESSURE: 71 MMHG | RESPIRATION RATE: 14 BRPM

## 2025-05-03 VITALS — DIASTOLIC BLOOD PRESSURE: 72 MMHG | HEART RATE: 102 BPM | RESPIRATION RATE: 11 BRPM | SYSTOLIC BLOOD PRESSURE: 109 MMHG

## 2025-05-03 VITALS — SYSTOLIC BLOOD PRESSURE: 92 MMHG | DIASTOLIC BLOOD PRESSURE: 60 MMHG | RESPIRATION RATE: 14 BRPM | HEART RATE: 94 BPM

## 2025-05-03 VITALS — RESPIRATION RATE: 14 BRPM | HEART RATE: 99 BPM | SYSTOLIC BLOOD PRESSURE: 119 MMHG | DIASTOLIC BLOOD PRESSURE: 77 MMHG

## 2025-05-03 VITALS — RESPIRATION RATE: 12 BRPM | HEART RATE: 93 BPM

## 2025-05-03 VITALS — RESPIRATION RATE: 17 BRPM | HEART RATE: 98 BPM

## 2025-05-03 VITALS — SYSTOLIC BLOOD PRESSURE: 98 MMHG | HEART RATE: 95 BPM | RESPIRATION RATE: 13 BRPM | DIASTOLIC BLOOD PRESSURE: 55 MMHG

## 2025-05-03 VITALS — RESPIRATION RATE: 15 BRPM | HEART RATE: 106 BPM

## 2025-05-03 VITALS — RESPIRATION RATE: 23 BRPM | HEART RATE: 100 BPM

## 2025-05-03 VITALS — DIASTOLIC BLOOD PRESSURE: 55 MMHG | RESPIRATION RATE: 15 BRPM | SYSTOLIC BLOOD PRESSURE: 91 MMHG | HEART RATE: 95 BPM

## 2025-05-03 VITALS — RESPIRATION RATE: 15 BRPM | HEART RATE: 117 BPM | SYSTOLIC BLOOD PRESSURE: 89 MMHG | DIASTOLIC BLOOD PRESSURE: 58 MMHG

## 2025-05-03 VITALS — HEART RATE: 110 BPM | RESPIRATION RATE: 13 BRPM

## 2025-05-03 VITALS — HEART RATE: 91 BPM | RESPIRATION RATE: 14 BRPM

## 2025-05-03 VITALS — HEART RATE: 94 BPM | RESPIRATION RATE: 12 BRPM

## 2025-05-03 VITALS — HEART RATE: 97 BPM | RESPIRATION RATE: 20 BRPM

## 2025-05-03 VITALS — HEART RATE: 93 BPM | RESPIRATION RATE: 15 BRPM

## 2025-05-03 VITALS — HEART RATE: 97 BPM | RESPIRATION RATE: 16 BRPM

## 2025-05-03 VITALS — HEART RATE: 99 BPM | RESPIRATION RATE: 12 BRPM

## 2025-05-03 VITALS — HEART RATE: 98 BPM | RESPIRATION RATE: 13 BRPM

## 2025-05-03 VITALS — DIASTOLIC BLOOD PRESSURE: 79 MMHG | HEART RATE: 102 BPM | RESPIRATION RATE: 19 BRPM | SYSTOLIC BLOOD PRESSURE: 126 MMHG

## 2025-05-03 VITALS — HEART RATE: 91 BPM | RESPIRATION RATE: 18 BRPM

## 2025-05-03 VITALS — RESPIRATION RATE: 20 BRPM | HEART RATE: 103 BPM

## 2025-05-03 VITALS — HEART RATE: 100 BPM | RESPIRATION RATE: 12 BRPM

## 2025-05-03 VITALS — RESPIRATION RATE: 13 BRPM | HEART RATE: 103 BPM

## 2025-05-03 VITALS — HEART RATE: 92 BPM | RESPIRATION RATE: 16 BRPM

## 2025-05-03 LAB
ALBUMIN SERPL-MCNC: 1.1 G/DL (ref 3.5–5)
ALT SERPL-CCNC: < 6 U/L (ref 12–78)
AMMONIA PLAS-SCNC: < 10 UMOL/L (ref 11–32)
AST SERPL-CCNC: 29 U/L (ref 10–37)
BASOPHILS # BLD AUTO: 0.06 K/UL (ref 0–0.2)
BASOPHILS NFR BLD AUTO: 0.2 % (ref 0–5)
BILIRUB DIRECT SERPL-MCNC: 0.1 MG/DL (ref 0–0.3)
BILIRUB SERPL-MCNC: 0.3 MG/DL (ref 0.2–1)
BUN SERPL-MCNC: 18 MG/DL (ref 7–18)
CHLORIDE SERPL-SCNC: 104 MMOL/L (ref 101–111)
CK SERPL-CCNC: 10 U/L (ref 21–232)
CO2 SERPL-SCNC: 27 MMOL/L (ref 21–32)
CREAT SERPL-MCNC: 0.3 MG/DL (ref 0.5–1)
ERYTHROCYTE [DISTWIDTH] IN BLOOD BY AUTOMATED COUNT: 17.3 % (ref 11–15.5)
GFR SERPL CREATININE-BSD FRML MDRD: 116 ML/MIN (ref 90–?)
GLUCOSE SERPL-MCNC: 102 MG/DL (ref 70–105)
HBA1C MFR BLD: 4.8 % (ref 4–6)
HCT VFR BLD AUTO: 21.3 % (ref 36–48)
IMM GRANULOCYTES # BLD: 0.36 K/UL (ref 0–1)
INR PPP: 1.36 (ref 0.85–1.15)
LYMPHOCYTES # SPEC AUTO: 0.5 K/UL (ref 1–4.8)
LYMPHOCYTES NFR SPEC AUTO: 1.6 % (ref 21–51)
MCH RBC QN AUTO: 31.9 PG (ref 27–33)
MCHC RBC AUTO-ENTMCNC: 33.8 G/DL (ref 32–36)
MCV RBC AUTO: 94.2 FL (ref 79–99)
MONOCYTES # BLD AUTO: 0.3 K/UL (ref 0.1–1)
MONOCYTES NFR BLD AUTO: 1.1 % (ref 3–13)
NEUTROPHILS # BLD AUTO: 29.1 K/UL (ref 1.8–7.7)
NEUTROPHILS NFR BLD AUTO: 95.9 % (ref 40–77)
PLATELET # BLD AUTO: 655 K/UL (ref 130–400)
POTASSIUM SERPL-SCNC: 4.9 MMOL/L (ref 3.5–5.1)
PROT SERPL-MCNC: 4.9 G/DL (ref 6–8.3)
RBC # BLD AUTO: 2.26 MIL/UL (ref 4–5.5)
SODIUM SERPL-SCNC: 134 MMOL/L (ref 136–145)
WBC # BLD AUTO: 30.3 K/UL (ref 4.8–10.8)

## 2025-05-03 RX ADMIN — SODIUM CHLORIDE SCH MG: 9 INJECTION, SOLUTION INTRAVENOUS at 05:21

## 2025-05-03 RX ADMIN — MAGNESIUM SULFATE IN WATER PRN MLS/HR: 40 INJECTION, SOLUTION INTRAVENOUS at 05:21

## 2025-05-04 VITALS — RESPIRATION RATE: 13 BRPM | SYSTOLIC BLOOD PRESSURE: 102 MMHG | HEART RATE: 93 BPM | DIASTOLIC BLOOD PRESSURE: 68 MMHG

## 2025-05-04 VITALS — DIASTOLIC BLOOD PRESSURE: 68 MMHG | SYSTOLIC BLOOD PRESSURE: 129 MMHG | HEART RATE: 88 BPM | RESPIRATION RATE: 18 BRPM

## 2025-05-04 VITALS — DIASTOLIC BLOOD PRESSURE: 68 MMHG | RESPIRATION RATE: 12 BRPM | SYSTOLIC BLOOD PRESSURE: 119 MMHG | HEART RATE: 88 BPM

## 2025-05-04 VITALS — DIASTOLIC BLOOD PRESSURE: 61 MMHG | RESPIRATION RATE: 16 BRPM | HEART RATE: 93 BPM | SYSTOLIC BLOOD PRESSURE: 111 MMHG

## 2025-05-04 VITALS — RESPIRATION RATE: 15 BRPM | HEART RATE: 92 BPM | DIASTOLIC BLOOD PRESSURE: 80 MMHG | SYSTOLIC BLOOD PRESSURE: 120 MMHG

## 2025-05-04 VITALS — RESPIRATION RATE: 11 BRPM | HEART RATE: 87 BPM | DIASTOLIC BLOOD PRESSURE: 71 MMHG | SYSTOLIC BLOOD PRESSURE: 126 MMHG

## 2025-05-04 VITALS — HEART RATE: 91 BPM | RESPIRATION RATE: 17 BRPM

## 2025-05-04 VITALS — RESPIRATION RATE: 12 BRPM | HEART RATE: 92 BPM | DIASTOLIC BLOOD PRESSURE: 60 MMHG | SYSTOLIC BLOOD PRESSURE: 97 MMHG

## 2025-05-04 VITALS — RESPIRATION RATE: 12 BRPM | DIASTOLIC BLOOD PRESSURE: 72 MMHG | HEART RATE: 93 BPM | SYSTOLIC BLOOD PRESSURE: 120 MMHG

## 2025-05-04 VITALS — SYSTOLIC BLOOD PRESSURE: 126 MMHG | DIASTOLIC BLOOD PRESSURE: 90 MMHG | HEART RATE: 104 BPM | RESPIRATION RATE: 16 BRPM

## 2025-05-04 VITALS
SYSTOLIC BLOOD PRESSURE: 111 MMHG | DIASTOLIC BLOOD PRESSURE: 64 MMHG | TEMPERATURE: 97.7 F | HEART RATE: 94 BPM | RESPIRATION RATE: 13 BRPM

## 2025-05-04 VITALS — SYSTOLIC BLOOD PRESSURE: 114 MMHG | RESPIRATION RATE: 16 BRPM | HEART RATE: 103 BPM | DIASTOLIC BLOOD PRESSURE: 78 MMHG

## 2025-05-04 VITALS — RESPIRATION RATE: 14 BRPM | HEART RATE: 90 BPM | SYSTOLIC BLOOD PRESSURE: 118 MMHG | DIASTOLIC BLOOD PRESSURE: 70 MMHG

## 2025-05-04 VITALS — HEART RATE: 95 BPM | SYSTOLIC BLOOD PRESSURE: 130 MMHG | DIASTOLIC BLOOD PRESSURE: 75 MMHG | RESPIRATION RATE: 13 BRPM

## 2025-05-04 VITALS — RESPIRATION RATE: 13 BRPM | SYSTOLIC BLOOD PRESSURE: 120 MMHG | DIASTOLIC BLOOD PRESSURE: 80 MMHG | HEART RATE: 89 BPM

## 2025-05-04 VITALS
TEMPERATURE: 97.7 F | DIASTOLIC BLOOD PRESSURE: 77 MMHG | SYSTOLIC BLOOD PRESSURE: 124 MMHG | RESPIRATION RATE: 16 BRPM | HEART RATE: 59 BPM

## 2025-05-04 VITALS — DIASTOLIC BLOOD PRESSURE: 61 MMHG | RESPIRATION RATE: 12 BRPM | SYSTOLIC BLOOD PRESSURE: 103 MMHG | HEART RATE: 104 BPM

## 2025-05-04 VITALS — RESPIRATION RATE: 14 BRPM | DIASTOLIC BLOOD PRESSURE: 71 MMHG | SYSTOLIC BLOOD PRESSURE: 104 MMHG | HEART RATE: 92 BPM

## 2025-05-04 VITALS — SYSTOLIC BLOOD PRESSURE: 115 MMHG | DIASTOLIC BLOOD PRESSURE: 66 MMHG | RESPIRATION RATE: 12 BRPM | HEART RATE: 100 BPM

## 2025-05-04 VITALS — HEART RATE: 99 BPM | DIASTOLIC BLOOD PRESSURE: 54 MMHG | RESPIRATION RATE: 13 BRPM | SYSTOLIC BLOOD PRESSURE: 112 MMHG

## 2025-05-04 VITALS — DIASTOLIC BLOOD PRESSURE: 75 MMHG | SYSTOLIC BLOOD PRESSURE: 141 MMHG | RESPIRATION RATE: 15 BRPM | HEART RATE: 92 BPM

## 2025-05-04 VITALS — SYSTOLIC BLOOD PRESSURE: 112 MMHG | RESPIRATION RATE: 12 BRPM | HEART RATE: 94 BPM | DIASTOLIC BLOOD PRESSURE: 45 MMHG

## 2025-05-04 VITALS — RESPIRATION RATE: 14 BRPM | HEART RATE: 89 BPM | DIASTOLIC BLOOD PRESSURE: 67 MMHG | SYSTOLIC BLOOD PRESSURE: 99 MMHG

## 2025-05-04 VITALS — HEART RATE: 100 BPM | DIASTOLIC BLOOD PRESSURE: 79 MMHG | RESPIRATION RATE: 14 BRPM | SYSTOLIC BLOOD PRESSURE: 107 MMHG

## 2025-05-04 VITALS — RESPIRATION RATE: 12 BRPM | DIASTOLIC BLOOD PRESSURE: 66 MMHG | HEART RATE: 89 BPM | SYSTOLIC BLOOD PRESSURE: 116 MMHG

## 2025-05-04 VITALS — DIASTOLIC BLOOD PRESSURE: 54 MMHG | HEART RATE: 92 BPM | SYSTOLIC BLOOD PRESSURE: 105 MMHG | RESPIRATION RATE: 13 BRPM

## 2025-05-04 VITALS — DIASTOLIC BLOOD PRESSURE: 65 MMHG | HEART RATE: 104 BPM | SYSTOLIC BLOOD PRESSURE: 119 MMHG | RESPIRATION RATE: 15 BRPM

## 2025-05-04 VITALS — OXYGEN SATURATION: 98 % | RESPIRATION RATE: 17 BRPM | HEART RATE: 85 BPM

## 2025-05-04 VITALS — SYSTOLIC BLOOD PRESSURE: 107 MMHG | RESPIRATION RATE: 15 BRPM | HEART RATE: 100 BPM | DIASTOLIC BLOOD PRESSURE: 63 MMHG

## 2025-05-04 VITALS — HEART RATE: 94 BPM | DIASTOLIC BLOOD PRESSURE: 71 MMHG | RESPIRATION RATE: 17 BRPM | SYSTOLIC BLOOD PRESSURE: 110 MMHG

## 2025-05-04 VITALS — HEART RATE: 89 BPM | DIASTOLIC BLOOD PRESSURE: 70 MMHG | RESPIRATION RATE: 11 BRPM | SYSTOLIC BLOOD PRESSURE: 100 MMHG

## 2025-05-04 VITALS — SYSTOLIC BLOOD PRESSURE: 109 MMHG | DIASTOLIC BLOOD PRESSURE: 70 MMHG | HEART RATE: 91 BPM | RESPIRATION RATE: 13 BRPM

## 2025-05-04 VITALS — DIASTOLIC BLOOD PRESSURE: 67 MMHG | HEART RATE: 88 BPM | SYSTOLIC BLOOD PRESSURE: 136 MMHG | RESPIRATION RATE: 13 BRPM

## 2025-05-04 VITALS — SYSTOLIC BLOOD PRESSURE: 126 MMHG | HEART RATE: 88 BPM | RESPIRATION RATE: 12 BRPM | DIASTOLIC BLOOD PRESSURE: 75 MMHG

## 2025-05-04 VITALS — RESPIRATION RATE: 13 BRPM | SYSTOLIC BLOOD PRESSURE: 104 MMHG | HEART RATE: 88 BPM | DIASTOLIC BLOOD PRESSURE: 72 MMHG

## 2025-05-04 VITALS — DIASTOLIC BLOOD PRESSURE: 78 MMHG | SYSTOLIC BLOOD PRESSURE: 117 MMHG | RESPIRATION RATE: 14 BRPM | HEART RATE: 96 BPM

## 2025-05-04 VITALS — DIASTOLIC BLOOD PRESSURE: 83 MMHG | HEART RATE: 94 BPM | SYSTOLIC BLOOD PRESSURE: 116 MMHG | RESPIRATION RATE: 13 BRPM

## 2025-05-04 VITALS — RESPIRATION RATE: 14 BRPM | DIASTOLIC BLOOD PRESSURE: 74 MMHG | SYSTOLIC BLOOD PRESSURE: 120 MMHG | HEART RATE: 93 BPM

## 2025-05-04 VITALS — RESPIRATION RATE: 12 BRPM | SYSTOLIC BLOOD PRESSURE: 111 MMHG | DIASTOLIC BLOOD PRESSURE: 72 MMHG | HEART RATE: 88 BPM

## 2025-05-04 VITALS
HEART RATE: 89 BPM | DIASTOLIC BLOOD PRESSURE: 66 MMHG | RESPIRATION RATE: 12 BRPM | TEMPERATURE: 98 F | SYSTOLIC BLOOD PRESSURE: 100 MMHG

## 2025-05-04 VITALS — RESPIRATION RATE: 11 BRPM | SYSTOLIC BLOOD PRESSURE: 104 MMHG | DIASTOLIC BLOOD PRESSURE: 59 MMHG | HEART RATE: 98 BPM

## 2025-05-04 VITALS — RESPIRATION RATE: 11 BRPM | DIASTOLIC BLOOD PRESSURE: 69 MMHG | HEART RATE: 91 BPM | SYSTOLIC BLOOD PRESSURE: 123 MMHG

## 2025-05-04 VITALS — RESPIRATION RATE: 14 BRPM | DIASTOLIC BLOOD PRESSURE: 76 MMHG | SYSTOLIC BLOOD PRESSURE: 109 MMHG | HEART RATE: 87 BPM

## 2025-05-04 VITALS
RESPIRATION RATE: 14 BRPM | TEMPERATURE: 98.3 F | HEART RATE: 93 BPM | DIASTOLIC BLOOD PRESSURE: 84 MMHG | SYSTOLIC BLOOD PRESSURE: 120 MMHG

## 2025-05-04 VITALS — DIASTOLIC BLOOD PRESSURE: 78 MMHG | HEART RATE: 88 BPM | RESPIRATION RATE: 17 BRPM | SYSTOLIC BLOOD PRESSURE: 133 MMHG

## 2025-05-04 VITALS — DIASTOLIC BLOOD PRESSURE: 71 MMHG | RESPIRATION RATE: 14 BRPM | SYSTOLIC BLOOD PRESSURE: 103 MMHG | HEART RATE: 98 BPM

## 2025-05-04 VITALS — HEART RATE: 98 BPM | RESPIRATION RATE: 13 BRPM | DIASTOLIC BLOOD PRESSURE: 79 MMHG | SYSTOLIC BLOOD PRESSURE: 115 MMHG

## 2025-05-04 VITALS
DIASTOLIC BLOOD PRESSURE: 76 MMHG | SYSTOLIC BLOOD PRESSURE: 159 MMHG | HEART RATE: 91 BPM | TEMPERATURE: 96.8 F | RESPIRATION RATE: 16 BRPM

## 2025-05-04 VITALS — HEART RATE: 94 BPM | DIASTOLIC BLOOD PRESSURE: 65 MMHG | RESPIRATION RATE: 13 BRPM | SYSTOLIC BLOOD PRESSURE: 109 MMHG

## 2025-05-04 VITALS — HEART RATE: 92 BPM | SYSTOLIC BLOOD PRESSURE: 101 MMHG | RESPIRATION RATE: 13 BRPM | DIASTOLIC BLOOD PRESSURE: 65 MMHG

## 2025-05-04 VITALS — DIASTOLIC BLOOD PRESSURE: 101 MMHG | SYSTOLIC BLOOD PRESSURE: 140 MMHG | HEART RATE: 100 BPM | RESPIRATION RATE: 21 BRPM

## 2025-05-04 VITALS — DIASTOLIC BLOOD PRESSURE: 69 MMHG | SYSTOLIC BLOOD PRESSURE: 103 MMHG | HEART RATE: 94 BPM | RESPIRATION RATE: 12 BRPM

## 2025-05-04 VITALS — OXYGEN SATURATION: 100 %

## 2025-05-04 VITALS — HEART RATE: 92 BPM | RESPIRATION RATE: 16 BRPM | SYSTOLIC BLOOD PRESSURE: 130 MMHG | DIASTOLIC BLOOD PRESSURE: 72 MMHG

## 2025-05-04 VITALS — SYSTOLIC BLOOD PRESSURE: 123 MMHG | RESPIRATION RATE: 14 BRPM | HEART RATE: 94 BPM | DIASTOLIC BLOOD PRESSURE: 81 MMHG

## 2025-05-04 VITALS — DIASTOLIC BLOOD PRESSURE: 74 MMHG | RESPIRATION RATE: 15 BRPM | HEART RATE: 90 BPM | SYSTOLIC BLOOD PRESSURE: 125 MMHG

## 2025-05-04 VITALS — RESPIRATION RATE: 17 BRPM | HEART RATE: 89 BPM

## 2025-05-04 VITALS — HEART RATE: 90 BPM | RESPIRATION RATE: 13 BRPM | SYSTOLIC BLOOD PRESSURE: 110 MMHG | DIASTOLIC BLOOD PRESSURE: 63 MMHG

## 2025-05-04 VITALS — RESPIRATION RATE: 15 BRPM | DIASTOLIC BLOOD PRESSURE: 63 MMHG | SYSTOLIC BLOOD PRESSURE: 104 MMHG | HEART RATE: 93 BPM

## 2025-05-04 VITALS — HEART RATE: 86 BPM | SYSTOLIC BLOOD PRESSURE: 130 MMHG | RESPIRATION RATE: 14 BRPM | DIASTOLIC BLOOD PRESSURE: 78 MMHG

## 2025-05-04 VITALS
SYSTOLIC BLOOD PRESSURE: 102 MMHG | OXYGEN SATURATION: 100 % | DIASTOLIC BLOOD PRESSURE: 76 MMHG | RESPIRATION RATE: 20 BRPM | HEART RATE: 84 BPM

## 2025-05-04 VITALS
TEMPERATURE: 97.4 F | RESPIRATION RATE: 13 BRPM | SYSTOLIC BLOOD PRESSURE: 120 MMHG | HEART RATE: 87 BPM | DIASTOLIC BLOOD PRESSURE: 74 MMHG

## 2025-05-04 VITALS — RESPIRATION RATE: 20 BRPM | HEART RATE: 89 BPM | OXYGEN SATURATION: 100 %

## 2025-05-04 VITALS — RESPIRATION RATE: 14 BRPM | DIASTOLIC BLOOD PRESSURE: 80 MMHG | HEART RATE: 94 BPM | SYSTOLIC BLOOD PRESSURE: 145 MMHG

## 2025-05-04 VITALS — RESPIRATION RATE: 20 BRPM | HEART RATE: 98 BPM | OXYGEN SATURATION: 98 %

## 2025-05-04 VITALS — HEART RATE: 93 BPM | RESPIRATION RATE: 13 BRPM | DIASTOLIC BLOOD PRESSURE: 56 MMHG | SYSTOLIC BLOOD PRESSURE: 124 MMHG

## 2025-05-04 VITALS — RESPIRATION RATE: 17 BRPM | HEART RATE: 94 BPM

## 2025-05-04 LAB
ALBUMIN SERPL-MCNC: 1.2 G/DL (ref 3.5–5)
BASOPHILS # BLD AUTO: 0.06 K/UL (ref 0–0.2)
BASOPHILS NFR BLD AUTO: 0.2 % (ref 0–5)
BILIRUB SERPL-MCNC: 0.2 MG/DL (ref 0.2–1)
CELLS COUNTED: 100
CREAT SERPL-MCNC: 0.4 MG/DL (ref 0.5–1)
ERYTHROCYTE [DISTWIDTH] IN BLOOD BY AUTOMATED COUNT: 17.6 % (ref 11–15.5)
HCT VFR BLD AUTO: 21.5 % (ref 36–48)
IMM GRANULOCYTES # BLD: 0.79 K/UL (ref 0–1)
LYMPHOCYTES # SPEC AUTO: 0.7 K/UL (ref 1–4.8)
LYMPHOCYTES NFR SPEC AUTO: 2.2 % (ref 21–51)
MANUAL DIF COMMENT BLD-IMP: (no result)
MCH RBC QN AUTO: 31.3 PG (ref 27–33)
MCV RBC AUTO: 92.3 FL (ref 79–99)
MONOCYTES # BLD AUTO: 0.8 K/UL (ref 0.1–1)
MONOCYTES NFR BLD AUTO: 2.6 % (ref 3–13)
MONOCYTES NFR BLD MANUAL: 2 % (ref 2–9)
NEUTROPHILS # BLD AUTO: 28.4 K/UL (ref 1.8–7.7)
NEUTROPHILS NFR BLD AUTO: 92.4 % (ref 40–77)
NEUTS SEG NFR BLD MANUAL: 98 % (ref 40–70)
PLAT MORPH BLD: (no result)
PLATELET # BLD AUTO: 815 K/UL (ref 130–400)
POTASSIUM SERPL-SCNC: 4.4 MMOL/L (ref 3.5–5.1)
PROT SERPL-MCNC: 5.1 G/DL (ref 6–8.3)
RBC # BLD AUTO: 2.33 MIL/UL (ref 4–5.5)
RBC MORPH BLD: (no result)
VANCOMYCIN TROUGH SERPL-MCNC: 15.7 UG/ML (ref 10–20)
WBC # BLD AUTO: 30.7 K/UL (ref 4.8–10.8)

## 2025-05-04 RX ADMIN — Medication SCH APPL: at 11:42

## 2025-05-05 VITALS
SYSTOLIC BLOOD PRESSURE: 112 MMHG | TEMPERATURE: 97.9 F | HEART RATE: 91 BPM | DIASTOLIC BLOOD PRESSURE: 68 MMHG | RESPIRATION RATE: 18 BRPM

## 2025-05-05 VITALS
RESPIRATION RATE: 16 BRPM | DIASTOLIC BLOOD PRESSURE: 65 MMHG | TEMPERATURE: 97.9 F | HEART RATE: 86 BPM | SYSTOLIC BLOOD PRESSURE: 121 MMHG

## 2025-05-05 VITALS
DIASTOLIC BLOOD PRESSURE: 68 MMHG | RESPIRATION RATE: 16 BRPM | TEMPERATURE: 97.8 F | HEART RATE: 102 BPM | SYSTOLIC BLOOD PRESSURE: 112 MMHG

## 2025-05-05 VITALS
SYSTOLIC BLOOD PRESSURE: 137 MMHG | TEMPERATURE: 97.2 F | DIASTOLIC BLOOD PRESSURE: 87 MMHG | RESPIRATION RATE: 16 BRPM | HEART RATE: 93 BPM

## 2025-05-05 VITALS — HEART RATE: 102 BPM | RESPIRATION RATE: 18 BRPM

## 2025-05-05 VITALS — RESPIRATION RATE: 18 BRPM | OXYGEN SATURATION: 98 % | HEART RATE: 94 BPM

## 2025-05-05 VITALS
TEMPERATURE: 97.9 F | HEART RATE: 79 BPM | RESPIRATION RATE: 16 BRPM | OXYGEN SATURATION: 100 % | SYSTOLIC BLOOD PRESSURE: 117 MMHG | DIASTOLIC BLOOD PRESSURE: 73 MMHG

## 2025-05-05 VITALS — OXYGEN SATURATION: 98 % | HEART RATE: 83 BPM | RESPIRATION RATE: 18 BRPM

## 2025-05-05 VITALS
TEMPERATURE: 97.7 F | DIASTOLIC BLOOD PRESSURE: 75 MMHG | HEART RATE: 100 BPM | SYSTOLIC BLOOD PRESSURE: 123 MMHG | RESPIRATION RATE: 17 BRPM

## 2025-05-05 VITALS — OXYGEN SATURATION: 100 %

## 2025-05-05 VITALS — HEART RATE: 94 BPM | RESPIRATION RATE: 16 BRPM

## 2025-05-05 LAB
BASOPHILS # BLD AUTO: 0.05 K/UL (ref 0–0.2)
BASOPHILS NFR BLD AUTO: 0.2 % (ref 0–5)
CREAT SERPL-MCNC: 0.4 MG/DL (ref 0.5–1)
ERYTHROCYTE [DISTWIDTH] IN BLOOD BY AUTOMATED COUNT: 17.6 % (ref 11–15.5)
HCT VFR BLD AUTO: 23.3 % (ref 36–48)
IMM GRANULOCYTES # BLD: 0.95 K/UL (ref 0–1)
LYMPHOCYTES # SPEC AUTO: 0.6 K/UL (ref 1–4.8)
LYMPHOCYTES NFR SPEC AUTO: 2.1 % (ref 21–51)
MCH RBC QN AUTO: 30.8 PG (ref 27–33)
MCHC RBC AUTO-ENTMCNC: 32.6 G/DL (ref 32–36)
MCV RBC AUTO: 94.3 FL (ref 79–99)
MONOCYTES # BLD AUTO: 0.7 K/UL (ref 0.1–1)
MONOCYTES NFR BLD AUTO: 2.5 % (ref 3–13)
NEUTROPHILS NFR BLD AUTO: 91.7 % (ref 40–77)
NRBC BLD MANUAL-RTO: 0.2 % (ref 0–0.19)
PLATELET # BLD AUTO: 872 K/UL (ref 130–400)
POTASSIUM SERPL-SCNC: 4.1 MMOL/L (ref 3.5–5.1)
RBC # BLD AUTO: 2.47 MIL/UL (ref 4–5.5)
WBC # BLD AUTO: 27.2 K/UL (ref 4.8–10.8)

## 2025-05-05 RX ADMIN — MICAFUNGIN SODIUM SCH MLS/HR: 20 INJECTION, POWDER, LYOPHILIZED, FOR SOLUTION INTRAVENOUS at 16:01

## 2025-05-05 RX ADMIN — CASTOR OIL AND BALSAM, PERU SCH GM: 788; 87 OINTMENT TOPICAL at 21:00

## 2025-05-05 RX ADMIN — DEXTROSE MONOHYDRATE PRN ML: 500 INJECTION PARENTERAL at 12:05

## 2025-05-05 RX ADMIN — SODIUM CHLORIDE SCH MG: 9 INJECTION, SOLUTION INTRAVENOUS at 23:18

## 2025-05-06 VITALS
SYSTOLIC BLOOD PRESSURE: 159 MMHG | RESPIRATION RATE: 20 BRPM | DIASTOLIC BLOOD PRESSURE: 81 MMHG | TEMPERATURE: 98.3 F | HEART RATE: 87 BPM

## 2025-05-06 VITALS
SYSTOLIC BLOOD PRESSURE: 119 MMHG | HEART RATE: 95 BPM | DIASTOLIC BLOOD PRESSURE: 70 MMHG | RESPIRATION RATE: 16 BRPM | TEMPERATURE: 97.9 F

## 2025-05-06 VITALS
SYSTOLIC BLOOD PRESSURE: 122 MMHG | HEART RATE: 98 BPM | RESPIRATION RATE: 19 BRPM | DIASTOLIC BLOOD PRESSURE: 78 MMHG | TEMPERATURE: 98.2 F

## 2025-05-06 VITALS — HEART RATE: 69 BPM | RESPIRATION RATE: 18 BRPM

## 2025-05-06 VITALS
DIASTOLIC BLOOD PRESSURE: 71 MMHG | HEART RATE: 67 BPM | RESPIRATION RATE: 19 BRPM | SYSTOLIC BLOOD PRESSURE: 113 MMHG | OXYGEN SATURATION: 99 % | TEMPERATURE: 98.1 F

## 2025-05-06 VITALS
DIASTOLIC BLOOD PRESSURE: 75 MMHG | SYSTOLIC BLOOD PRESSURE: 128 MMHG | TEMPERATURE: 97.8 F | HEART RATE: 94 BPM | RESPIRATION RATE: 16 BRPM

## 2025-05-06 VITALS
DIASTOLIC BLOOD PRESSURE: 84 MMHG | TEMPERATURE: 97.8 F | HEART RATE: 75 BPM | RESPIRATION RATE: 19 BRPM | SYSTOLIC BLOOD PRESSURE: 128 MMHG

## 2025-05-06 VITALS — OXYGEN SATURATION: 96 % | RESPIRATION RATE: 18 BRPM | HEART RATE: 101 BPM

## 2025-05-06 VITALS — HEART RATE: 89 BPM | RESPIRATION RATE: 18 BRPM | OXYGEN SATURATION: 97 %

## 2025-05-06 VITALS — RESPIRATION RATE: 18 BRPM | HEART RATE: 67 BPM | OXYGEN SATURATION: 95 %

## 2025-05-06 VITALS — OXYGEN SATURATION: 99 %

## 2025-05-06 LAB
ALBUMIN SERPL-MCNC: 1.3 G/DL (ref 3.5–5)
BASOPHILS # BLD AUTO: 0.01 K/UL (ref 0–0.2)
BASOPHILS NFR BLD AUTO: 0.1 % (ref 0–5)
BILIRUB SERPL-MCNC: 0.2 MG/DL (ref 0.2–1)
CREAT SERPL-MCNC: 0.5 MG/DL (ref 0.5–1)
ERYTHROCYTE [DISTWIDTH] IN BLOOD BY AUTOMATED COUNT: 18.2 % (ref 11–15.5)
HCT VFR BLD AUTO: 21.5 % (ref 36–48)
HCT VFR BLD AUTO: 27.5 % (ref 36–48)
IMM GRANULOCYTES # BLD: 0.38 K/UL (ref 0–1)
LYMPHOCYTES # SPEC AUTO: 0.7 K/UL (ref 1–4.8)
LYMPHOCYTES NFR SPEC AUTO: 4.5 % (ref 21–51)
MAGNESIUM SERPL-MCNC: 2.1 MG/DL (ref 1.8–2.4)
MCH RBC QN AUTO: 30.8 PG (ref 27–33)
MCHC RBC AUTO-ENTMCNC: 32.1 G/DL (ref 32–36)
MONOCYTES # BLD AUTO: 1.2 K/UL (ref 0.1–1)
MONOCYTES NFR BLD AUTO: 7.4 % (ref 3–13)
NEUTROPHILS # BLD AUTO: 13.5 K/UL (ref 1.8–7.7)
NEUTROPHILS NFR BLD AUTO: 85.6 % (ref 40–77)
NRBC BLD MANUAL-RTO: 0.2 % (ref 0–0.19)
PLATELET # BLD AUTO: 759 K/UL (ref 130–400)
POTASSIUM SERPL-SCNC: 3.6 MMOL/L (ref 3.5–5.1)
PROT SERPL-MCNC: 4.7 G/DL (ref 6–8.3)
RBC # BLD AUTO: 2.24 MIL/UL (ref 4–5.5)
WBC # BLD AUTO: 15.7 K/UL (ref 4.8–10.8)

## 2025-05-06 PROCEDURE — 30233N1 TRANSFUSION OF NONAUTOLOGOUS RED BLOOD CELLS INTO PERIPHERAL VEIN, PERCUTANEOUS APPROACH: ICD-10-PCS | Performed by: INTERNAL MEDICINE

## 2025-05-06 RX ADMIN — CYANOCOBALAMIN SCH MCG: 1000 INJECTION, SOLUTION INTRAMUSCULAR; SUBCUTANEOUS at 14:15

## 2025-05-06 RX ADMIN — BACITRACIN SCH GM: 500 OINTMENT TOPICAL at 11:18

## 2025-05-07 VITALS
RESPIRATION RATE: 18 BRPM | DIASTOLIC BLOOD PRESSURE: 67 MMHG | SYSTOLIC BLOOD PRESSURE: 121 MMHG | HEART RATE: 55 BPM | TEMPERATURE: 98.4 F

## 2025-05-07 VITALS
HEART RATE: 84 BPM | TEMPERATURE: 98.1 F | SYSTOLIC BLOOD PRESSURE: 146 MMHG | RESPIRATION RATE: 20 BRPM | DIASTOLIC BLOOD PRESSURE: 84 MMHG

## 2025-05-07 VITALS — OXYGEN SATURATION: 97 %

## 2025-05-07 VITALS
TEMPERATURE: 98.2 F | RESPIRATION RATE: 16 BRPM | DIASTOLIC BLOOD PRESSURE: 71 MMHG | SYSTOLIC BLOOD PRESSURE: 122 MMHG | HEART RATE: 60 BPM

## 2025-05-07 VITALS — SYSTOLIC BLOOD PRESSURE: 115 MMHG | RESPIRATION RATE: 18 BRPM | HEART RATE: 54 BPM | DIASTOLIC BLOOD PRESSURE: 70 MMHG

## 2025-05-07 VITALS
SYSTOLIC BLOOD PRESSURE: 140 MMHG | DIASTOLIC BLOOD PRESSURE: 85 MMHG | TEMPERATURE: 97.8 F | HEART RATE: 60 BPM | RESPIRATION RATE: 18 BRPM

## 2025-05-07 VITALS — RESPIRATION RATE: 18 BRPM | HEART RATE: 58 BPM

## 2025-05-07 VITALS — HEART RATE: 72 BPM | RESPIRATION RATE: 18 BRPM | OXYGEN SATURATION: 95 %

## 2025-05-07 VITALS — OXYGEN SATURATION: 95 %

## 2025-05-07 VITALS — RESPIRATION RATE: 18 BRPM | OXYGEN SATURATION: 97 % | HEART RATE: 77 BPM

## 2025-05-07 VITALS — HEART RATE: 72 BPM | RESPIRATION RATE: 18 BRPM

## 2025-05-07 LAB
ALBUMIN SERPL-MCNC: 1.4 G/DL (ref 3.5–5)
BASOPHILS # BLD AUTO: 0.02 K/UL (ref 0–0.2)
BASOPHILS NFR BLD AUTO: 0.1 % (ref 0–5)
BILIRUB SERPL-MCNC: 0.2 MG/DL (ref 0.2–1)
CREAT SERPL-MCNC: 0.5 MG/DL (ref 0.5–1)
EOSINOPHIL # BLD AUTO: 0.03 K/UL (ref 0–0.7)
EOSINOPHIL NFR BLD AUTO: 0.2 % (ref 0–8)
ERYTHROCYTE [DISTWIDTH] IN BLOOD BY AUTOMATED COUNT: 17.4 % (ref 11–15.5)
HCT VFR BLD AUTO: 29.8 % (ref 36–48)
IMM GRANULOCYTES # BLD: 0.22 K/UL (ref 0–1)
LYMPHOCYTES # SPEC AUTO: 0.6 K/UL (ref 1–4.8)
LYMPHOCYTES NFR SPEC AUTO: 4.1 % (ref 21–51)
MAGNESIUM SERPL-MCNC: 2.4 MG/DL (ref 1.8–2.4)
MCH RBC QN AUTO: 30.7 PG (ref 27–33)
MCHC RBC AUTO-ENTMCNC: 33.2 G/DL (ref 32–36)
MCV RBC AUTO: 92.3 FL (ref 79–99)
MONOCYTES # BLD AUTO: 1.2 K/UL (ref 0.1–1)
NEUTROPHILS # BLD AUTO: 13.2 K/UL (ref 1.8–7.7)
NEUTROPHILS NFR BLD AUTO: 86.2 % (ref 40–77)
NRBC BLD MANUAL-RTO: 0.1 % (ref 0–0.19)
PLATELET # BLD AUTO: 819 K/UL (ref 130–400)
POTASSIUM SERPL-SCNC: 2.5 MMOL/L (ref 3.5–5.1)
RBC # BLD AUTO: 3.23 MIL/UL (ref 4–5.5)
WBC # BLD AUTO: 15.3 K/UL (ref 4.8–10.8)

## 2025-05-07 RX ADMIN — SODIUM CHLORIDE SCH MG: 9 INJECTION, SOLUTION INTRAVENOUS at 13:06

## 2025-05-07 RX ADMIN — POTASSIUM CHLORIDE ONE MEQ: 1500 TABLET, EXTENDED RELEASE ORAL at 12:30

## 2025-05-08 VITALS
RESPIRATION RATE: 16 BRPM | DIASTOLIC BLOOD PRESSURE: 65 MMHG | HEART RATE: 67 BPM | SYSTOLIC BLOOD PRESSURE: 129 MMHG | TEMPERATURE: 98.2 F

## 2025-05-08 VITALS
RESPIRATION RATE: 17 BRPM | DIASTOLIC BLOOD PRESSURE: 75 MMHG | HEART RATE: 79 BPM | TEMPERATURE: 98.4 F | SYSTOLIC BLOOD PRESSURE: 111 MMHG

## 2025-05-08 VITALS
RESPIRATION RATE: 19 BRPM | HEART RATE: 88 BPM | TEMPERATURE: 98.3 F | SYSTOLIC BLOOD PRESSURE: 120 MMHG | DIASTOLIC BLOOD PRESSURE: 78 MMHG

## 2025-05-08 VITALS
HEART RATE: 90 BPM | OXYGEN SATURATION: 91 % | SYSTOLIC BLOOD PRESSURE: 123 MMHG | TEMPERATURE: 98 F | DIASTOLIC BLOOD PRESSURE: 76 MMHG | RESPIRATION RATE: 17 BRPM

## 2025-05-08 VITALS — HEART RATE: 75 BPM | RESPIRATION RATE: 17 BRPM

## 2025-05-08 VITALS — RESPIRATION RATE: 18 BRPM | HEART RATE: 90 BPM

## 2025-05-08 VITALS
TEMPERATURE: 97.9 F | RESPIRATION RATE: 18 BRPM | HEART RATE: 92 BPM | DIASTOLIC BLOOD PRESSURE: 77 MMHG | SYSTOLIC BLOOD PRESSURE: 125 MMHG

## 2025-05-08 VITALS
DIASTOLIC BLOOD PRESSURE: 74 MMHG | HEART RATE: 54 BPM | TEMPERATURE: 99 F | RESPIRATION RATE: 16 BRPM | SYSTOLIC BLOOD PRESSURE: 117 MMHG

## 2025-05-08 VITALS — OXYGEN SATURATION: 98 % | HEART RATE: 75 BPM | RESPIRATION RATE: 18 BRPM

## 2025-05-08 VITALS — HEART RATE: 80 BPM | RESPIRATION RATE: 18 BRPM

## 2025-05-08 VITALS — OXYGEN SATURATION: 96 %

## 2025-05-08 VITALS — HEART RATE: 90 BPM | RESPIRATION RATE: 18 BRPM | OXYGEN SATURATION: 95 %

## 2025-05-08 LAB
ALBUMIN SERPL-MCNC: 1.1 G/DL (ref 3.5–5)
BILIRUB SERPL-MCNC: 0.2 MG/DL (ref 0.2–1)
CREAT SERPL-MCNC: 0.4 MG/DL (ref 0.5–1)
ERYTHROCYTE [DISTWIDTH] IN BLOOD BY AUTOMATED COUNT: 17.4 % (ref 11–15.5)
HCT VFR BLD AUTO: 26.4 % (ref 36–48)
MAGNESIUM SERPL-MCNC: 1.5 MG/DL (ref 1.8–2.4)
MCH RBC QN AUTO: 30.7 PG (ref 27–33)
MCV RBC AUTO: 93.3 FL (ref 79–99)
NRBC BLD MANUAL-RTO: 0.1 % (ref 0–0.19)
PROT SERPL-MCNC: 4.1 G/DL (ref 6–8.3)
RBC # BLD AUTO: 2.83 MIL/UL (ref 4–5.5)
WBC # BLD AUTO: 13.7 K/UL (ref 4.8–10.8)

## 2025-05-08 RX ADMIN — POTASSIUM CHLORIDE PRN MEQ: 1.5 SOLUTION ORAL at 08:10

## 2025-05-08 RX ADMIN — POTASSIUM CHLORIDE SCH MEQ: 1.5 SOLUTION ORAL at 21:30

## 2025-05-09 VITALS
HEART RATE: 95 BPM | SYSTOLIC BLOOD PRESSURE: 140 MMHG | TEMPERATURE: 98 F | DIASTOLIC BLOOD PRESSURE: 81 MMHG | RESPIRATION RATE: 18 BRPM

## 2025-05-09 VITALS
HEART RATE: 104 BPM | SYSTOLIC BLOOD PRESSURE: 134 MMHG | RESPIRATION RATE: 16 BRPM | TEMPERATURE: 98.4 F | DIASTOLIC BLOOD PRESSURE: 80 MMHG

## 2025-05-09 VITALS
RESPIRATION RATE: 20 BRPM | DIASTOLIC BLOOD PRESSURE: 100 MMHG | HEART RATE: 103 BPM | SYSTOLIC BLOOD PRESSURE: 123 MMHG | TEMPERATURE: 98.2 F

## 2025-05-09 VITALS — OXYGEN SATURATION: 98 % | RESPIRATION RATE: 18 BRPM | HEART RATE: 87 BPM

## 2025-05-09 VITALS — OXYGEN SATURATION: 97 % | HEART RATE: 95 BPM | RESPIRATION RATE: 18 BRPM

## 2025-05-09 VITALS
SYSTOLIC BLOOD PRESSURE: 124 MMHG | RESPIRATION RATE: 19 BRPM | TEMPERATURE: 98.4 F | DIASTOLIC BLOOD PRESSURE: 81 MMHG | HEART RATE: 83 BPM

## 2025-05-09 VITALS
DIASTOLIC BLOOD PRESSURE: 75 MMHG | TEMPERATURE: 98.4 F | SYSTOLIC BLOOD PRESSURE: 126 MMHG | HEART RATE: 92 BPM | RESPIRATION RATE: 19 BRPM

## 2025-05-09 VITALS — RESPIRATION RATE: 18 BRPM | HEART RATE: 84 BPM

## 2025-05-09 VITALS
RESPIRATION RATE: 17 BRPM | DIASTOLIC BLOOD PRESSURE: 84 MMHG | SYSTOLIC BLOOD PRESSURE: 125 MMHG | TEMPERATURE: 98.3 F | HEART RATE: 97 BPM

## 2025-05-09 VITALS — HEART RATE: 81 BPM | RESPIRATION RATE: 18 BRPM

## 2025-05-09 VITALS — OXYGEN SATURATION: 98 %

## 2025-05-09 LAB
ALBUMIN SERPL-MCNC: 1.2 G/DL (ref 3.5–5)
BILIRUB SERPL-MCNC: 0.3 MG/DL (ref 0.2–1)
CREAT SERPL-MCNC: 0.4 MG/DL (ref 0.5–1)
ERYTHROCYTE [DISTWIDTH] IN BLOOD BY AUTOMATED COUNT: 17.9 % (ref 11–15.5)
MAGNESIUM SERPL-MCNC: 1.7 MG/DL (ref 1.8–2.4)
MCH RBC QN AUTO: 30.7 PG (ref 27–33)
MCHC RBC AUTO-ENTMCNC: 32.9 G/DL (ref 32–36)
MCV RBC AUTO: 93.3 FL (ref 79–99)
POTASSIUM SERPL-SCNC: 4.2 MMOL/L (ref 3.5–5.1)
PROT SERPL-MCNC: 4.5 G/DL (ref 6–8.3)
WBC # BLD AUTO: 12.4 K/UL (ref 4.8–10.8)